# Patient Record
Sex: MALE | Race: WHITE
[De-identification: names, ages, dates, MRNs, and addresses within clinical notes are randomized per-mention and may not be internally consistent; named-entity substitution may affect disease eponyms.]

---

## 2018-01-22 ENCOUNTER — HOSPITAL ENCOUNTER (INPATIENT)
Dept: HOSPITAL 100 - ED | Age: 68
LOS: 4 days | Discharge: HOME | DRG: 286 | End: 2018-01-26
Payer: MEDICARE

## 2018-01-22 VITALS
OXYGEN SATURATION: 94 % | HEART RATE: 123 BPM | SYSTOLIC BLOOD PRESSURE: 153 MMHG | RESPIRATION RATE: 22 BRPM | DIASTOLIC BLOOD PRESSURE: 102 MMHG

## 2018-01-22 VITALS
RESPIRATION RATE: 19 BRPM | SYSTOLIC BLOOD PRESSURE: 153 MMHG | DIASTOLIC BLOOD PRESSURE: 114 MMHG | TEMPERATURE: 98.06 F | HEART RATE: 124 BPM | OXYGEN SATURATION: 97 %

## 2018-01-22 VITALS
OXYGEN SATURATION: 95 % | TEMPERATURE: 96.8 F | SYSTOLIC BLOOD PRESSURE: 126 MMHG | RESPIRATION RATE: 16 BRPM | DIASTOLIC BLOOD PRESSURE: 97 MMHG | HEART RATE: 120 BPM

## 2018-01-22 VITALS
OXYGEN SATURATION: 98 % | RESPIRATION RATE: 26 BRPM | TEMPERATURE: 98.06 F | SYSTOLIC BLOOD PRESSURE: 120 MMHG | HEART RATE: 124 BPM | DIASTOLIC BLOOD PRESSURE: 98 MMHG

## 2018-01-22 VITALS
HEART RATE: 109 BPM | OXYGEN SATURATION: 96 % | SYSTOLIC BLOOD PRESSURE: 202 MMHG | TEMPERATURE: 98 F | DIASTOLIC BLOOD PRESSURE: 111 MMHG | RESPIRATION RATE: 18 BRPM

## 2018-01-22 VITALS
SYSTOLIC BLOOD PRESSURE: 165 MMHG | DIASTOLIC BLOOD PRESSURE: 119 MMHG | RESPIRATION RATE: 18 BRPM | HEART RATE: 123 BPM | OXYGEN SATURATION: 95 % | TEMPERATURE: 98.06 F

## 2018-01-22 VITALS
OXYGEN SATURATION: 95 % | SYSTOLIC BLOOD PRESSURE: 127 MMHG | TEMPERATURE: 98.06 F | RESPIRATION RATE: 24 BRPM | HEART RATE: 120 BPM | DIASTOLIC BLOOD PRESSURE: 79 MMHG

## 2018-01-22 VITALS
RESPIRATION RATE: 13 BRPM | DIASTOLIC BLOOD PRESSURE: 101 MMHG | OXYGEN SATURATION: 100 % | TEMPERATURE: 96.8 F | HEART RATE: 118 BPM | SYSTOLIC BLOOD PRESSURE: 126 MMHG

## 2018-01-22 VITALS
TEMPERATURE: 98.06 F | RESPIRATION RATE: 20 BRPM | DIASTOLIC BLOOD PRESSURE: 96 MMHG | SYSTOLIC BLOOD PRESSURE: 141 MMHG | HEART RATE: 123 BPM | OXYGEN SATURATION: 96 %

## 2018-01-22 VITALS
RESPIRATION RATE: 18 BRPM | HEART RATE: 124 BPM | TEMPERATURE: 98.1 F | OXYGEN SATURATION: 99 % | DIASTOLIC BLOOD PRESSURE: 103 MMHG | SYSTOLIC BLOOD PRESSURE: 155 MMHG

## 2018-01-22 VITALS
TEMPERATURE: 98.06 F | RESPIRATION RATE: 18 BRPM | HEART RATE: 123 BPM | OXYGEN SATURATION: 96 % | DIASTOLIC BLOOD PRESSURE: 110 MMHG | SYSTOLIC BLOOD PRESSURE: 154 MMHG

## 2018-01-22 VITALS — HEART RATE: 121 BPM

## 2018-01-22 VITALS
RESPIRATION RATE: 28 BRPM | OXYGEN SATURATION: 94 % | SYSTOLIC BLOOD PRESSURE: 148 MMHG | DIASTOLIC BLOOD PRESSURE: 100 MMHG | HEART RATE: 124 BPM

## 2018-01-22 VITALS
RESPIRATION RATE: 20 BRPM | DIASTOLIC BLOOD PRESSURE: 103 MMHG | HEART RATE: 123 BPM | OXYGEN SATURATION: 96 % | SYSTOLIC BLOOD PRESSURE: 115 MMHG

## 2018-01-22 VITALS
HEART RATE: 125 BPM | RESPIRATION RATE: 18 BRPM | DIASTOLIC BLOOD PRESSURE: 98 MMHG | SYSTOLIC BLOOD PRESSURE: 142 MMHG | OXYGEN SATURATION: 98 %

## 2018-01-22 VITALS
RESPIRATION RATE: 19 BRPM | OXYGEN SATURATION: 95 % | DIASTOLIC BLOOD PRESSURE: 87 MMHG | SYSTOLIC BLOOD PRESSURE: 133 MMHG | HEART RATE: 119 BPM | TEMPERATURE: 97.6 F

## 2018-01-22 VITALS — HEART RATE: 117 BPM

## 2018-01-22 VITALS
TEMPERATURE: 97.52 F | SYSTOLIC BLOOD PRESSURE: 130 MMHG | OXYGEN SATURATION: 99 % | RESPIRATION RATE: 19 BRPM | DIASTOLIC BLOOD PRESSURE: 93 MMHG | HEART RATE: 120 BPM

## 2018-01-22 VITALS — DIASTOLIC BLOOD PRESSURE: 91 MMHG | SYSTOLIC BLOOD PRESSURE: 137 MMHG

## 2018-01-22 VITALS
SYSTOLIC BLOOD PRESSURE: 143 MMHG | RESPIRATION RATE: 20 BRPM | HEART RATE: 123 BPM | DIASTOLIC BLOOD PRESSURE: 100 MMHG | OXYGEN SATURATION: 98 %

## 2018-01-22 VITALS — BODY MASS INDEX: 30.7 KG/M2 | BODY MASS INDEX: 29.7 KG/M2

## 2018-01-22 VITALS — HEART RATE: 124 BPM

## 2018-01-22 DIAGNOSIS — Z87.891: ICD-10-CM

## 2018-01-22 DIAGNOSIS — I48.92: ICD-10-CM

## 2018-01-22 DIAGNOSIS — I50.21: ICD-10-CM

## 2018-01-22 DIAGNOSIS — I42.9: ICD-10-CM

## 2018-01-22 DIAGNOSIS — K64.8: ICD-10-CM

## 2018-01-22 DIAGNOSIS — I48.91: Primary | ICD-10-CM

## 2018-01-22 DIAGNOSIS — K57.91: ICD-10-CM

## 2018-01-22 DIAGNOSIS — I25.10: ICD-10-CM

## 2018-01-22 LAB
ALANINE AMINOTRANSFER ALT/SGPT: 55 U/L (ref 12–78)
ALBUMIN SERPL-MCNC: 3.8 G/DL (ref 3.4–5)
ALKALINE PHOSPHATASE: 96 U/L (ref 45–117)
ANION GAP: 11 (ref 5–15)
AST(SGOT): 25 U/L (ref 15–37)
BILIRUB DIRECT SERPL-MCNC: 0.3 MG/DL (ref 0–0.3)
BNP,B-TYPE NATRIURETIC PEPTIDE: 874.6 PG/ML (ref 0–100)
BUN SERPL-MCNC: 19 MG/DL (ref 7–18)
BUN/CREAT RATIO: 14.4 RATIO (ref 10–20)
CALCIUM SERPL-MCNC: 8.9 MG/DL (ref 8.5–10.1)
CARBON DIOXIDE: 22 MMOL/L (ref 21–32)
CHLORIDE: 108 MMOL/L (ref 98–107)
CHOLEST SERPL-MCNC: 166 MG/DL
D-DIMER QUANTITATIVE (DVT/PE): 0.84 FEU/UG/M (ref 0.27–0.49)
DEPRECATED RDW RBC: 46.1 FL (ref 35.1–43.9)
DIFFERENTIAL INDICATED: (no result)
ERYTHROCYTE [DISTWIDTH] IN BLOOD: 14.1 % (ref 11.6–14.6)
EST GLOM FILT RATE - AFR AMER: 70 ML/MIN (ref 60–?)
ESTIMATED CREATININE CLEARANCE: 63.05 ML/MIN
GLOBULIN: 3.5 G/DL (ref 2.2–4.2)
GLUCOSE: 110 MG/DL (ref 70–110)
HCT VFR BLD AUTO: 44.1 % (ref 40–54)
HEMOGLOBIN: 14.2 G/DL (ref 13–16.5)
HGB BLD-MCNC: 14.2 G/DL (ref 13–16.5)
IMMATURE GRANULOCYTES COUNT: 0.03 X10^3/UL (ref 0–0)
LIPASE: 116 U/L (ref 73–393)
MCV RBC: 92.3 FL (ref 80–94)
MEAN CORP HGB CONC: 32.2 G/GL (ref 32–36)
MEAN PLATELET VOL.: 10.7 FL (ref 6.2–12)
PLATELET # BLD: 210 K/MM3 (ref 150–450)
PLATELET COUNT: 210 K/MM3 (ref 150–450)
POSITIVE COUNT: NO
POSITIVE DIFFERENTIAL: NO
POSITIVE MORPHOLOGY: NO
POTASSIUM: 4.3 MMOL/L (ref 3.5–5.1)
RBC # BLD AUTO: 4.78 M/MM3 (ref 4.6–6.2)
RBC DISTRIBUTION WIDTH CV: 14.1 % (ref 11.6–14.6)
RBC DISTRIBUTION WIDTH SD: 46.1 FL (ref 35.1–43.9)
TRIGLYCERIDES: 119 MG/DL
VLDLC SERPL-MCNC: 24 MG/DL (ref 5–40)
WBC # BLD AUTO: 13.2 K/MM3 (ref 4.4–11)
WHITE BLOOD COUNT: 13.2 K/MM3 (ref 4.4–11)

## 2018-01-22 PROCEDURE — 93312 ECHO TRANSESOPHAGEAL: CPT

## 2018-01-22 PROCEDURE — 85027 COMPLETE CBC AUTOMATED: CPT

## 2018-01-22 PROCEDURE — 71275 CT ANGIOGRAPHY CHEST: CPT

## 2018-01-22 PROCEDURE — 84443 ASSAY THYROID STIM HORMONE: CPT

## 2018-01-22 PROCEDURE — 83880 ASSAY OF NATRIURETIC PEPTIDE: CPT

## 2018-01-22 PROCEDURE — 93325 DOPPLER ECHO COLOR FLOW MAPG: CPT

## 2018-01-22 PROCEDURE — 93306 TTE W/DOPPLER COMPLETE: CPT

## 2018-01-22 PROCEDURE — 97802 MEDICAL NUTRITION INDIV IN: CPT

## 2018-01-22 PROCEDURE — 99152 MOD SED SAME PHYS/QHP 5/>YRS: CPT

## 2018-01-22 PROCEDURE — C1894 INTRO/SHEATH, NON-LASER: HCPCS

## 2018-01-22 PROCEDURE — 93005 ELECTROCARDIOGRAM TRACING: CPT

## 2018-01-22 PROCEDURE — 99285 EMERGENCY DEPT VISIT HI MDM: CPT

## 2018-01-22 PROCEDURE — 93460 R&L HRT ART/VENTRICLE ANGIO: CPT

## 2018-01-22 PROCEDURE — 80076 HEPATIC FUNCTION PANEL: CPT

## 2018-01-22 PROCEDURE — 92960 CARDIOVERSION ELECTRIC EXT: CPT

## 2018-01-22 PROCEDURE — 76705 ECHO EXAM OF ABDOMEN: CPT

## 2018-01-22 PROCEDURE — 93320 DOPPLER ECHO COMPLETE: CPT

## 2018-01-22 PROCEDURE — 84484 ASSAY OF TROPONIN QUANT: CPT

## 2018-01-22 PROCEDURE — 80061 LIPID PANEL: CPT

## 2018-01-22 PROCEDURE — 83690 ASSAY OF LIPASE: CPT

## 2018-01-22 PROCEDURE — 82803 BLOOD GASES ANY COMBINATION: CPT

## 2018-01-22 PROCEDURE — 84439 ASSAY OF FREE THYROXINE: CPT

## 2018-01-22 PROCEDURE — A4216 STERILE WATER/SALINE, 10 ML: HCPCS

## 2018-01-22 PROCEDURE — 85730 THROMBOPLASTIN TIME PARTIAL: CPT

## 2018-01-22 PROCEDURE — 93971 EXTREMITY STUDY: CPT

## 2018-01-22 PROCEDURE — 80048 BASIC METABOLIC PNL TOTAL CA: CPT

## 2018-01-22 PROCEDURE — C1751 CATH, INF, PER/CENT/MIDLINE: HCPCS

## 2018-01-22 PROCEDURE — 36415 COLL VENOUS BLD VENIPUNCTURE: CPT

## 2018-01-22 PROCEDURE — 71045 X-RAY EXAM CHEST 1 VIEW: CPT

## 2018-01-22 PROCEDURE — 99153 MOD SED SAME PHYS/QHP EA: CPT

## 2018-01-22 PROCEDURE — 85610 PROTHROMBIN TIME: CPT

## 2018-01-22 PROCEDURE — 85025 COMPLETE CBC W/AUTO DIFF WBC: CPT

## 2018-01-22 PROCEDURE — 99406 BEHAV CHNG SMOKING 3-10 MIN: CPT

## 2018-01-22 PROCEDURE — 85379 FIBRIN DEGRADATION QUANT: CPT

## 2018-01-22 PROCEDURE — C1769 GUIDE WIRE: HCPCS

## 2018-01-23 VITALS
DIASTOLIC BLOOD PRESSURE: 89 MMHG | TEMPERATURE: 98 F | HEART RATE: 119 BPM | SYSTOLIC BLOOD PRESSURE: 136 MMHG | RESPIRATION RATE: 19 BRPM | OXYGEN SATURATION: 94 %

## 2018-01-23 VITALS
HEART RATE: 116 BPM | OXYGEN SATURATION: 99 % | SYSTOLIC BLOOD PRESSURE: 135 MMHG | TEMPERATURE: 97.1 F | DIASTOLIC BLOOD PRESSURE: 109 MMHG | RESPIRATION RATE: 20 BRPM

## 2018-01-23 VITALS
TEMPERATURE: 96.98 F | HEART RATE: 116 BPM | DIASTOLIC BLOOD PRESSURE: 107 MMHG | SYSTOLIC BLOOD PRESSURE: 139 MMHG | RESPIRATION RATE: 17 BRPM | OXYGEN SATURATION: 97 %

## 2018-01-23 VITALS
SYSTOLIC BLOOD PRESSURE: 125 MMHG | HEART RATE: 118 BPM | OXYGEN SATURATION: 97 % | TEMPERATURE: 98 F | RESPIRATION RATE: 17 BRPM | DIASTOLIC BLOOD PRESSURE: 80 MMHG

## 2018-01-23 VITALS
OXYGEN SATURATION: 100 % | TEMPERATURE: 98.1 F | DIASTOLIC BLOOD PRESSURE: 93 MMHG | RESPIRATION RATE: 20 BRPM | HEART RATE: 114 BPM | SYSTOLIC BLOOD PRESSURE: 128 MMHG

## 2018-01-23 VITALS
HEART RATE: 117 BPM | DIASTOLIC BLOOD PRESSURE: 86 MMHG | TEMPERATURE: 98.06 F | RESPIRATION RATE: 18 BRPM | OXYGEN SATURATION: 99 % | SYSTOLIC BLOOD PRESSURE: 116 MMHG

## 2018-01-23 VITALS
OXYGEN SATURATION: 99 % | RESPIRATION RATE: 18 BRPM | SYSTOLIC BLOOD PRESSURE: 134 MMHG | DIASTOLIC BLOOD PRESSURE: 124 MMHG | TEMPERATURE: 98 F | HEART RATE: 116 BPM

## 2018-01-23 VITALS
HEART RATE: 116 BPM | DIASTOLIC BLOOD PRESSURE: 97 MMHG | TEMPERATURE: 98.24 F | SYSTOLIC BLOOD PRESSURE: 120 MMHG | OXYGEN SATURATION: 99 % | RESPIRATION RATE: 17 BRPM

## 2018-01-23 VITALS
HEART RATE: 118 BPM | RESPIRATION RATE: 17 BRPM | SYSTOLIC BLOOD PRESSURE: 120 MMHG | TEMPERATURE: 98.06 F | OXYGEN SATURATION: 94 % | DIASTOLIC BLOOD PRESSURE: 92 MMHG

## 2018-01-23 VITALS
SYSTOLIC BLOOD PRESSURE: 130 MMHG | TEMPERATURE: 97 F | DIASTOLIC BLOOD PRESSURE: 89 MMHG | OXYGEN SATURATION: 96 % | HEART RATE: 115 BPM | RESPIRATION RATE: 18 BRPM

## 2018-01-23 VITALS
HEART RATE: 115 BPM | TEMPERATURE: 97.5 F | SYSTOLIC BLOOD PRESSURE: 127 MMHG | RESPIRATION RATE: 20 BRPM | OXYGEN SATURATION: 99 % | DIASTOLIC BLOOD PRESSURE: 82 MMHG

## 2018-01-23 VITALS
TEMPERATURE: 96.8 F | SYSTOLIC BLOOD PRESSURE: 124 MMHG | HEART RATE: 115 BPM | OXYGEN SATURATION: 95 % | DIASTOLIC BLOOD PRESSURE: 79 MMHG | RESPIRATION RATE: 17 BRPM

## 2018-01-23 VITALS
DIASTOLIC BLOOD PRESSURE: 93 MMHG | RESPIRATION RATE: 15 BRPM | TEMPERATURE: 96.8 F | SYSTOLIC BLOOD PRESSURE: 125 MMHG | HEART RATE: 117 BPM | OXYGEN SATURATION: 97 %

## 2018-01-23 VITALS
OXYGEN SATURATION: 96 % | TEMPERATURE: 98 F | RESPIRATION RATE: 20 BRPM | HEART RATE: 118 BPM | SYSTOLIC BLOOD PRESSURE: 116 MMHG | DIASTOLIC BLOOD PRESSURE: 85 MMHG

## 2018-01-23 VITALS
RESPIRATION RATE: 11 BRPM | DIASTOLIC BLOOD PRESSURE: 88 MMHG | OXYGEN SATURATION: 97 % | TEMPERATURE: 98.1 F | SYSTOLIC BLOOD PRESSURE: 114 MMHG | HEART RATE: 113 BPM

## 2018-01-23 VITALS
RESPIRATION RATE: 16 BRPM | TEMPERATURE: 98 F | HEART RATE: 118 BPM | DIASTOLIC BLOOD PRESSURE: 68 MMHG | OXYGEN SATURATION: 94 % | SYSTOLIC BLOOD PRESSURE: 117 MMHG

## 2018-01-23 VITALS
DIASTOLIC BLOOD PRESSURE: 80 MMHG | HEART RATE: 116 BPM | RESPIRATION RATE: 13 BRPM | SYSTOLIC BLOOD PRESSURE: 125 MMHG | TEMPERATURE: 96.8 F | OXYGEN SATURATION: 100 %

## 2018-01-23 VITALS
DIASTOLIC BLOOD PRESSURE: 102 MMHG | OXYGEN SATURATION: 97 % | HEART RATE: 118 BPM | TEMPERATURE: 98.3 F | RESPIRATION RATE: 23 BRPM | SYSTOLIC BLOOD PRESSURE: 128 MMHG

## 2018-01-23 VITALS
DIASTOLIC BLOOD PRESSURE: 110 MMHG | SYSTOLIC BLOOD PRESSURE: 130 MMHG | OXYGEN SATURATION: 99 % | TEMPERATURE: 98.06 F | RESPIRATION RATE: 19 BRPM | HEART RATE: 117 BPM

## 2018-01-23 VITALS — HEART RATE: 115 BPM

## 2018-01-23 VITALS
RESPIRATION RATE: 18 BRPM | DIASTOLIC BLOOD PRESSURE: 87 MMHG | TEMPERATURE: 97.52 F | OXYGEN SATURATION: 99 % | HEART RATE: 114 BPM | SYSTOLIC BLOOD PRESSURE: 123 MMHG

## 2018-01-23 VITALS
HEART RATE: 70 BPM | SYSTOLIC BLOOD PRESSURE: 130 MMHG | RESPIRATION RATE: 14 BRPM | TEMPERATURE: 97.1 F | DIASTOLIC BLOOD PRESSURE: 91 MMHG | OXYGEN SATURATION: 98 %

## 2018-01-23 VITALS
DIASTOLIC BLOOD PRESSURE: 110 MMHG | SYSTOLIC BLOOD PRESSURE: 125 MMHG | RESPIRATION RATE: 11 BRPM | OXYGEN SATURATION: 100 % | HEART RATE: 117 BPM | TEMPERATURE: 96.8 F

## 2018-01-23 VITALS
TEMPERATURE: 98.24 F | OXYGEN SATURATION: 98 % | HEART RATE: 117 BPM | RESPIRATION RATE: 19 BRPM | DIASTOLIC BLOOD PRESSURE: 100 MMHG | SYSTOLIC BLOOD PRESSURE: 135 MMHG

## 2018-01-23 VITALS
RESPIRATION RATE: 20 BRPM | SYSTOLIC BLOOD PRESSURE: 120 MMHG | DIASTOLIC BLOOD PRESSURE: 86 MMHG | TEMPERATURE: 98.24 F | HEART RATE: 116 BPM | OXYGEN SATURATION: 96 %

## 2018-01-23 VITALS — HEART RATE: 114 BPM

## 2018-01-23 VITALS — HEART RATE: 116 BPM

## 2018-01-23 LAB
ANION GAP: 7 (ref 5–15)
BUN SERPL-MCNC: 20 MG/DL (ref 7–18)
BUN/CREAT RATIO: 14.9 RATIO (ref 10–20)
CALCIUM SERPL-MCNC: 8.4 MG/DL (ref 8.5–10.1)
CARBON DIOXIDE: 25 MMOL/L (ref 21–32)
CHLORIDE: 107 MMOL/L (ref 98–107)
DEPRECATED RDW RBC: 46.1 FL (ref 35.1–43.9)
ERYTHROCYTE [DISTWIDTH] IN BLOOD: 14 % (ref 11.6–14.6)
EST GLOM FILT RATE - AFR AMER: 69 ML/MIN (ref 60–?)
ESTIMATED CREATININE CLEARANCE: 62.11 ML/MIN
GLUCOSE: 111 MG/DL (ref 70–110)
HCT VFR BLD AUTO: 38.9 % (ref 40–54)
HEMOGLOBIN: 12.8 G/DL (ref 13–16.5)
HGB BLD-MCNC: 12.8 G/DL (ref 13–16.5)
MCV RBC: 92.4 FL (ref 80–94)
MEAN CORP HGB CONC: 32.9 G/GL (ref 32–36)
MEAN PLATELET VOL.: 11 FL (ref 6.2–12)
PLATELET # BLD: 165 K/MM3 (ref 150–450)
PLATELET COUNT: 165 K/MM3 (ref 150–450)
POTASSIUM: 4 MMOL/L (ref 3.5–5.1)
RBC # BLD AUTO: 4.21 M/MM3 (ref 4.6–6.2)
RBC DISTRIBUTION WIDTH CV: 14 % (ref 11.6–14.6)
RBC DISTRIBUTION WIDTH SD: 46.1 FL (ref 35.1–43.9)
SCAN INDICATED ON CBC? Y/N: NO
WBC # BLD AUTO: 9.5 K/MM3 (ref 4.4–11)
WHITE BLOOD COUNT: 9.5 K/MM3 (ref 4.4–11)

## 2018-01-23 RX ADMIN — SODIUM CHLORIDE, PRESERVATIVE FREE 0 ML: 5 INJECTION INTRAVENOUS at 08:52

## 2018-01-23 RX ADMIN — SODIUM CHLORIDE, PRESERVATIVE FREE 0 ML: 5 INJECTION INTRAVENOUS at 17:51

## 2018-01-23 RX ADMIN — SENNOSIDES 2 TABLET: 8.6 TABLET, FILM COATED ORAL at 14:11

## 2018-01-24 VITALS
OXYGEN SATURATION: 96 % | DIASTOLIC BLOOD PRESSURE: 80 MMHG | RESPIRATION RATE: 13 BRPM | HEART RATE: 113 BPM | TEMPERATURE: 97.7 F | SYSTOLIC BLOOD PRESSURE: 117 MMHG

## 2018-01-24 VITALS
RESPIRATION RATE: 12 BRPM | TEMPERATURE: 97.9 F | SYSTOLIC BLOOD PRESSURE: 96 MMHG | DIASTOLIC BLOOD PRESSURE: 78 MMHG | HEART RATE: 109 BPM | OXYGEN SATURATION: 96 %

## 2018-01-24 VITALS
TEMPERATURE: 97.6 F | DIASTOLIC BLOOD PRESSURE: 92 MMHG | OXYGEN SATURATION: 96 % | HEART RATE: 114 BPM | SYSTOLIC BLOOD PRESSURE: 118 MMHG | RESPIRATION RATE: 22 BRPM

## 2018-01-24 VITALS
RESPIRATION RATE: 10 BRPM | HEART RATE: 110 BPM | OXYGEN SATURATION: 95 % | TEMPERATURE: 97.7 F | SYSTOLIC BLOOD PRESSURE: 112 MMHG | DIASTOLIC BLOOD PRESSURE: 85 MMHG

## 2018-01-24 VITALS
OXYGEN SATURATION: 100 % | RESPIRATION RATE: 20 BRPM | HEART RATE: 111 BPM | SYSTOLIC BLOOD PRESSURE: 112 MMHG | DIASTOLIC BLOOD PRESSURE: 93 MMHG

## 2018-01-24 VITALS
OXYGEN SATURATION: 96 % | SYSTOLIC BLOOD PRESSURE: 124 MMHG | DIASTOLIC BLOOD PRESSURE: 82 MMHG | HEART RATE: 113 BPM | TEMPERATURE: 97.88 F | RESPIRATION RATE: 19 BRPM

## 2018-01-24 VITALS
HEART RATE: 114 BPM | RESPIRATION RATE: 17 BRPM | SYSTOLIC BLOOD PRESSURE: 117 MMHG | OXYGEN SATURATION: 98 % | TEMPERATURE: 97.6 F | DIASTOLIC BLOOD PRESSURE: 82 MMHG

## 2018-01-24 VITALS
SYSTOLIC BLOOD PRESSURE: 122 MMHG | OXYGEN SATURATION: 96 % | HEART RATE: 113 BPM | DIASTOLIC BLOOD PRESSURE: 99 MMHG | RESPIRATION RATE: 15 BRPM | TEMPERATURE: 97.52 F

## 2018-01-24 VITALS — HEART RATE: 112 BPM

## 2018-01-24 VITALS
OXYGEN SATURATION: 97 % | RESPIRATION RATE: 16 BRPM | TEMPERATURE: 97.88 F | SYSTOLIC BLOOD PRESSURE: 125 MMHG | HEART RATE: 113 BPM | DIASTOLIC BLOOD PRESSURE: 88 MMHG

## 2018-01-24 VITALS
OXYGEN SATURATION: 100 % | HEART RATE: 109 BPM | DIASTOLIC BLOOD PRESSURE: 80 MMHG | RESPIRATION RATE: 18 BRPM | SYSTOLIC BLOOD PRESSURE: 103 MMHG

## 2018-01-24 VITALS
OXYGEN SATURATION: 97 % | DIASTOLIC BLOOD PRESSURE: 83 MMHG | RESPIRATION RATE: 18 BRPM | SYSTOLIC BLOOD PRESSURE: 103 MMHG | HEART RATE: 110 BPM

## 2018-01-24 VITALS
DIASTOLIC BLOOD PRESSURE: 81 MMHG | RESPIRATION RATE: 22 BRPM | OXYGEN SATURATION: 95 % | HEART RATE: 112 BPM | SYSTOLIC BLOOD PRESSURE: 115 MMHG

## 2018-01-24 VITALS
SYSTOLIC BLOOD PRESSURE: 112 MMHG | TEMPERATURE: 98.06 F | DIASTOLIC BLOOD PRESSURE: 85 MMHG | RESPIRATION RATE: 16 BRPM | OXYGEN SATURATION: 96 % | HEART RATE: 113 BPM

## 2018-01-24 VITALS
OXYGEN SATURATION: 96 % | RESPIRATION RATE: 17 BRPM | SYSTOLIC BLOOD PRESSURE: 104 MMHG | HEART RATE: 109 BPM | DIASTOLIC BLOOD PRESSURE: 79 MMHG

## 2018-01-24 VITALS
RESPIRATION RATE: 14 BRPM | SYSTOLIC BLOOD PRESSURE: 125 MMHG | OXYGEN SATURATION: 97 % | TEMPERATURE: 97.9 F | DIASTOLIC BLOOD PRESSURE: 80 MMHG | HEART RATE: 113 BPM

## 2018-01-24 VITALS
HEART RATE: 114 BPM | OXYGEN SATURATION: 97 % | SYSTOLIC BLOOD PRESSURE: 126 MMHG | TEMPERATURE: 97.6 F | DIASTOLIC BLOOD PRESSURE: 98 MMHG | RESPIRATION RATE: 20 BRPM

## 2018-01-24 VITALS
SYSTOLIC BLOOD PRESSURE: 107 MMHG | TEMPERATURE: 97.88 F | RESPIRATION RATE: 16 BRPM | OXYGEN SATURATION: 97 % | HEART RATE: 112 BPM | DIASTOLIC BLOOD PRESSURE: 73 MMHG

## 2018-01-24 VITALS
SYSTOLIC BLOOD PRESSURE: 114 MMHG | HEART RATE: 114 BPM | RESPIRATION RATE: 14 BRPM | DIASTOLIC BLOOD PRESSURE: 88 MMHG | TEMPERATURE: 97.6 F | OXYGEN SATURATION: 98 %

## 2018-01-24 VITALS
DIASTOLIC BLOOD PRESSURE: 92 MMHG | SYSTOLIC BLOOD PRESSURE: 120 MMHG | RESPIRATION RATE: 22 BRPM | OXYGEN SATURATION: 100 % | HEART RATE: 111 BPM

## 2018-01-24 VITALS
SYSTOLIC BLOOD PRESSURE: 127 MMHG | OXYGEN SATURATION: 99 % | RESPIRATION RATE: 15 BRPM | TEMPERATURE: 97.88 F | DIASTOLIC BLOOD PRESSURE: 94 MMHG | HEART RATE: 113 BPM

## 2018-01-24 VITALS — HEART RATE: 113 BPM

## 2018-01-24 VITALS — HEART RATE: 109 BPM

## 2018-01-24 LAB
ANION GAP: 11 (ref 5–15)
APTT PPP: 36 SECONDS (ref 24.1–36.2)
BASE EXCESS BLDV CALC-SCNC: -2 MMOL/L
BASE EXCESS BLDV CALC-SCNC: 0 MMOL/L (ref -1–3.5)
BUN SERPL-MCNC: 21 MG/DL (ref 7–18)
BUN/CREAT RATIO: 15.1 RATIO (ref 10–20)
CALCIUM SERPL-MCNC: 8.6 MG/DL (ref 8.5–10.1)
CARBON DIOXIDE: 24 MMOL/L (ref 21–32)
CHLORIDE: 105 MMOL/L (ref 98–107)
CO2 BLDA-SCNC: 26 MMOL/L (ref 23–33)
DEPRECATED RDW RBC: 47.4 FL (ref 35.1–43.9)
DIFFERENTIAL INDICATED: (no result)
ERYTHROCYTE [DISTWIDTH] IN BLOOD: 14.1 % (ref 11.6–14.6)
EST GLOM FILT RATE - AFR AMER: 66 ML/MIN (ref 60–?)
ESTIMATED CREATININE CLEARANCE: 59.88 ML/MIN
GLUCOSE: 120 MG/DL (ref 70–110)
HCT VFR BLD AUTO: 41.3 % (ref 40–54)
HEMOGLOBIN: 13 G/DL (ref 13–16.5)
HGB BLD-MCNC: 13 G/DL (ref 13–16.5)
IMMATURE GRANULOCYTES COUNT: 0.01 X10^3/UL (ref 0–0)
MCV RBC: 92.8 FL (ref 80–94)
MEAN CORP HGB CONC: 31.5 G/GL (ref 32–36)
MEAN PLATELET VOL.: 10.8 FL (ref 6.2–12)
PLATELET # BLD: 178 K/MM3 (ref 150–450)
PLATELET COUNT: 178 K/MM3 (ref 150–450)
PO2 BLDA: 56 MMHG (ref 75–100)
PO2 BLDV: 26 MMHG (ref 25–40)
POSITIVE COUNT: NO
POSITIVE DIFFERENTIAL: NO
POSITIVE MORPHOLOGY: NO
POTASSIUM: 3.9 MMOL/L (ref 3.5–5.1)
PROTHROMBIN TIME (PROTIME)PT.: 14.3 SECONDS (ref 11.7–14.9)
RBC # BLD AUTO: 4.45 M/MM3 (ref 4.6–6.2)
RBC DISTRIBUTION WIDTH CV: 14.1 % (ref 11.6–14.6)
RBC DISTRIBUTION WIDTH SD: 47.4 FL (ref 35.1–43.9)
SO2: 90 % (ref 95–99)
VBG SO2: 50 % (ref 50–70)
WBC # BLD AUTO: 9.5 K/MM3 (ref 4.4–11)
WHITE BLOOD COUNT: 9.5 K/MM3 (ref 4.4–11)

## 2018-01-24 RX ADMIN — SODIUM CHLORIDE 15 ML: 9 INJECTION, SOLUTION INTRAVENOUS at 05:57

## 2018-01-24 RX ADMIN — SODIUM CHLORIDE, PRESERVATIVE FREE 0 ML: 5 INJECTION INTRAVENOUS at 12:02

## 2018-01-24 RX ADMIN — SENNOSIDES 2 TABLET: 8.6 TABLET, FILM COATED ORAL at 12:02

## 2018-01-25 VITALS
RESPIRATION RATE: 18 BRPM | TEMPERATURE: 98.06 F | DIASTOLIC BLOOD PRESSURE: 81 MMHG | HEART RATE: 110 BPM | SYSTOLIC BLOOD PRESSURE: 119 MMHG | OXYGEN SATURATION: 98 %

## 2018-01-25 VITALS
DIASTOLIC BLOOD PRESSURE: 77 MMHG | RESPIRATION RATE: 18 BRPM | TEMPERATURE: 98.06 F | SYSTOLIC BLOOD PRESSURE: 121 MMHG | OXYGEN SATURATION: 98 % | HEART RATE: 110 BPM

## 2018-01-25 VITALS
SYSTOLIC BLOOD PRESSURE: 124 MMHG | DIASTOLIC BLOOD PRESSURE: 71 MMHG | RESPIRATION RATE: 16 BRPM | HEART RATE: 113 BPM | OXYGEN SATURATION: 100 % | TEMPERATURE: 98.06 F

## 2018-01-25 VITALS — HEART RATE: 112 BPM

## 2018-01-25 VITALS
SYSTOLIC BLOOD PRESSURE: 115 MMHG | OXYGEN SATURATION: 98 % | HEART RATE: 112 BPM | RESPIRATION RATE: 16 BRPM | DIASTOLIC BLOOD PRESSURE: 74 MMHG | TEMPERATURE: 98 F

## 2018-01-25 VITALS — OXYGEN SATURATION: 100 %

## 2018-01-25 VITALS — HEART RATE: 110 BPM

## 2018-01-25 VITALS — HEART RATE: 115 BPM

## 2018-01-25 LAB
ANION GAP: 9 (ref 5–15)
BUN SERPL-MCNC: 23 MG/DL (ref 7–18)
BUN/CREAT RATIO: 16.2 RATIO (ref 10–20)
CALCIUM SERPL-MCNC: 8.7 MG/DL (ref 8.5–10.1)
CARBON DIOXIDE: 26 MMOL/L (ref 21–32)
CHLORIDE: 104 MMOL/L (ref 98–107)
EST GLOM FILT RATE - AFR AMER: 64 ML/MIN (ref 60–?)
ESTIMATED CREATININE CLEARANCE: 58.61 ML/MIN
GLUCOSE: 131 MG/DL (ref 70–110)
POTASSIUM: 3.9 MMOL/L (ref 3.5–5.1)

## 2018-01-25 RX ADMIN — SENNOSIDES 2 TABLET: 8.6 TABLET, FILM COATED ORAL at 10:13

## 2018-01-25 RX ADMIN — SODIUM CHLORIDE, PRESERVATIVE FREE 0 ML: 5 INJECTION INTRAVENOUS at 10:12

## 2018-01-25 RX ADMIN — APIXABAN 2.5 MG: 2.5 TABLET, FILM COATED ORAL at 21:13

## 2018-01-26 VITALS
OXYGEN SATURATION: 97 % | SYSTOLIC BLOOD PRESSURE: 112 MMHG | HEART RATE: 62 BPM | RESPIRATION RATE: 14 BRPM | TEMPERATURE: 98.06 F | DIASTOLIC BLOOD PRESSURE: 75 MMHG

## 2018-01-26 VITALS
RESPIRATION RATE: 15 BRPM | TEMPERATURE: 98.2 F | SYSTOLIC BLOOD PRESSURE: 98 MMHG | HEART RATE: 86 BPM | DIASTOLIC BLOOD PRESSURE: 71 MMHG | OXYGEN SATURATION: 97 %

## 2018-01-26 VITALS — HEART RATE: 87 BPM

## 2018-01-26 VITALS
RESPIRATION RATE: 18 BRPM | DIASTOLIC BLOOD PRESSURE: 70 MMHG | HEART RATE: 111 BPM | SYSTOLIC BLOOD PRESSURE: 100 MMHG | OXYGEN SATURATION: 97 % | TEMPERATURE: 98.3 F

## 2018-01-26 VITALS
HEART RATE: 74 BPM | OXYGEN SATURATION: 97 % | DIASTOLIC BLOOD PRESSURE: 62 MMHG | SYSTOLIC BLOOD PRESSURE: 100 MMHG | RESPIRATION RATE: 16 BRPM

## 2018-01-26 VITALS — HEART RATE: 112 BPM

## 2018-01-26 VITALS — OXYGEN SATURATION: 98 %

## 2018-01-26 VITALS
TEMPERATURE: 98.2 F | OXYGEN SATURATION: 97 % | SYSTOLIC BLOOD PRESSURE: 86 MMHG | HEART RATE: 87 BPM | DIASTOLIC BLOOD PRESSURE: 58 MMHG | RESPIRATION RATE: 16 BRPM

## 2018-01-26 VITALS
RESPIRATION RATE: 15 BRPM | HEART RATE: 87 BPM | SYSTOLIC BLOOD PRESSURE: 92 MMHG | TEMPERATURE: 98.1 F | OXYGEN SATURATION: 94 % | DIASTOLIC BLOOD PRESSURE: 61 MMHG

## 2018-01-26 VITALS
HEART RATE: 108 BPM | TEMPERATURE: 97.7 F | SYSTOLIC BLOOD PRESSURE: 104 MMHG | DIASTOLIC BLOOD PRESSURE: 65 MMHG | OXYGEN SATURATION: 95 % | RESPIRATION RATE: 18 BRPM

## 2018-01-26 VITALS
RESPIRATION RATE: 16 BRPM | HEART RATE: 88 BPM | DIASTOLIC BLOOD PRESSURE: 71 MMHG | OXYGEN SATURATION: 96 % | SYSTOLIC BLOOD PRESSURE: 100 MMHG | TEMPERATURE: 98.24 F

## 2018-01-26 VITALS — HEART RATE: 110 BPM

## 2018-01-26 RX ADMIN — APIXABAN 2.5 MG: 2.5 TABLET, FILM COATED ORAL at 10:00

## 2018-01-26 RX ADMIN — SENNOSIDES 2 TABLET: 8.6 TABLET, FILM COATED ORAL at 15:18

## 2018-01-26 RX ADMIN — SODIUM CHLORIDE 15 ML: 9 INJECTION, SOLUTION INTRAVENOUS at 06:37

## 2018-01-26 RX ADMIN — SODIUM CHLORIDE, PRESERVATIVE FREE 0 ML: 5 INJECTION INTRAVENOUS at 06:14

## 2018-02-23 ENCOUNTER — HOSPITAL ENCOUNTER (OUTPATIENT)
Age: 68
End: 2018-02-23
Payer: MEDICARE

## 2018-02-23 DIAGNOSIS — I50.9: ICD-10-CM

## 2018-02-23 DIAGNOSIS — I48.91: Primary | ICD-10-CM

## 2018-02-23 LAB
ANION GAP: 7 (ref 5–15)
BUN SERPL-MCNC: 25 MG/DL (ref 7–18)
BUN/CREAT RATIO: 20.5 RATIO (ref 10–20)
CALCIUM SERPL-MCNC: 9.1 MG/DL (ref 8.5–10.1)
CARBON DIOXIDE: 28 MMOL/L (ref 21–32)
CHLORIDE: 106 MMOL/L (ref 98–107)
EST GLOM FILT RATE - AFR AMER: 77 ML/MIN (ref 60–?)
GLUCOSE: 83 MG/DL (ref 74–106)
POTASSIUM: 4.2 MMOL/L (ref 3.5–5.1)

## 2018-02-23 PROCEDURE — 80048 BASIC METABOLIC PNL TOTAL CA: CPT

## 2018-02-23 PROCEDURE — 36415 COLL VENOUS BLD VENIPUNCTURE: CPT

## 2018-05-16 ENCOUNTER — HOSPITAL ENCOUNTER (OUTPATIENT)
Age: 68
End: 2018-05-16
Payer: MEDICARE

## 2018-05-16 DIAGNOSIS — I43: ICD-10-CM

## 2018-05-16 DIAGNOSIS — I50.9: Primary | ICD-10-CM

## 2018-05-16 PROCEDURE — 93306 TTE W/DOPPLER COMPLETE: CPT

## 2018-05-23 ENCOUNTER — HOSPITAL ENCOUNTER (OUTPATIENT)
Age: 68
End: 2018-05-23
Payer: MEDICARE

## 2018-05-23 DIAGNOSIS — I43: ICD-10-CM

## 2018-05-23 DIAGNOSIS — I48.91: ICD-10-CM

## 2018-05-23 DIAGNOSIS — I50.9: Primary | ICD-10-CM

## 2018-05-23 DIAGNOSIS — Z79.899: ICD-10-CM

## 2018-05-23 LAB
ALANINE AMINOTRANSFER ALT/SGPT: 29 U/L (ref 16–61)
ALBUMIN SERPL-MCNC: 3.8 G/DL (ref 3.2–5)
ALKALINE PHOSPHATASE: 62 U/L (ref 45–117)
ANION GAP: 6 (ref 5–15)
AST(SGOT): 17 U/L (ref 15–37)
BILIRUB DIRECT SERPL-MCNC: < 0.05 MG/DL (ref 0–0.3)
BUN SERPL-MCNC: 17 MG/DL (ref 7–18)
BUN/CREAT RATIO: 12.2 RATIO (ref 10–20)
CALCIUM SERPL-MCNC: 9 MG/DL (ref 8.5–10.1)
CARBON DIOXIDE: 28 MMOL/L (ref 21–32)
CHLORIDE: 107 MMOL/L (ref 98–107)
EST GLOM FILT RATE - AFR AMER: 66 ML/MIN (ref 60–?)
GLOBULIN: 3.7 G/DL (ref 2.2–4.2)
GLUCOSE: 94 MG/DL (ref 74–106)
POTASSIUM: 4.9 MMOL/L (ref 3.5–5.1)
T4 SERPL-MCNC: 9.2 UG/DL (ref 4.5–12.1)

## 2018-05-23 PROCEDURE — 84443 ASSAY THYROID STIM HORMONE: CPT

## 2018-05-23 PROCEDURE — 84436 ASSAY OF TOTAL THYROXINE: CPT

## 2018-05-23 PROCEDURE — 80048 BASIC METABOLIC PNL TOTAL CA: CPT

## 2018-05-23 PROCEDURE — 80076 HEPATIC FUNCTION PANEL: CPT

## 2018-08-22 ENCOUNTER — HOSPITAL ENCOUNTER (OUTPATIENT)
Age: 68
End: 2018-08-22
Payer: MEDICARE

## 2018-08-22 DIAGNOSIS — I42.9: ICD-10-CM

## 2018-08-22 DIAGNOSIS — I10: Primary | ICD-10-CM

## 2018-08-22 LAB
ANION GAP: 9 (ref 5–15)
BUN SERPL-MCNC: 22 MG/DL (ref 7–18)
BUN/CREAT RATIO: 15.7 RATIO (ref 10–20)
CALCIUM SERPL-MCNC: 9 MG/DL (ref 8.5–10.1)
CARBON DIOXIDE: 26 MMOL/L (ref 21–32)
CHLORIDE: 106 MMOL/L (ref 98–107)
EST GLOM FILT RATE - AFR AMER: 65 ML/MIN (ref 60–?)
GLUCOSE: 128 MG/DL (ref 74–106)
MAGNESIUM: 2.3 MG/DL (ref 1.6–2.6)
POTASSIUM: 4.1 MMOL/L (ref 3.5–5.1)

## 2018-08-22 PROCEDURE — 83735 ASSAY OF MAGNESIUM: CPT

## 2018-08-22 PROCEDURE — 36415 COLL VENOUS BLD VENIPUNCTURE: CPT

## 2018-08-22 PROCEDURE — 80048 BASIC METABOLIC PNL TOTAL CA: CPT

## 2019-01-14 ENCOUNTER — HOSPITAL ENCOUNTER (OUTPATIENT)
Age: 69
End: 2019-01-14
Payer: MEDICARE

## 2019-01-14 VITALS — BODY MASS INDEX: 30.6 KG/M2

## 2019-01-14 DIAGNOSIS — Z79.899: Primary | ICD-10-CM

## 2019-01-14 LAB — T4 SERPL-MCNC: 8.7 UG/DL (ref 4.5–12.1)

## 2019-01-14 PROCEDURE — 84443 ASSAY THYROID STIM HORMONE: CPT

## 2019-01-14 PROCEDURE — 84436 ASSAY OF TOTAL THYROXINE: CPT

## 2019-01-14 PROCEDURE — 36415 COLL VENOUS BLD VENIPUNCTURE: CPT

## 2019-01-22 ENCOUNTER — HOSPITAL ENCOUNTER (OUTPATIENT)
Age: 69
End: 2019-01-22
Payer: MEDICARE

## 2019-01-22 VITALS — BODY MASS INDEX: 30.6 KG/M2

## 2019-01-22 DIAGNOSIS — I50.9: Primary | ICD-10-CM

## 2019-01-22 DIAGNOSIS — J44.9: ICD-10-CM

## 2019-01-22 DIAGNOSIS — I43: ICD-10-CM

## 2019-01-22 DIAGNOSIS — Z79.899: ICD-10-CM

## 2019-01-22 PROCEDURE — 94729 DIFFUSING CAPACITY: CPT

## 2019-01-22 PROCEDURE — 71046 X-RAY EXAM CHEST 2 VIEWS: CPT

## 2019-01-22 PROCEDURE — 94060 EVALUATION OF WHEEZING: CPT

## 2019-01-22 PROCEDURE — 94726 PLETHYSMOGRAPHY LUNG VOLUMES: CPT

## 2019-01-25 ENCOUNTER — HOSPITAL ENCOUNTER (OUTPATIENT)
Age: 69
End: 2019-01-25
Payer: MEDICARE

## 2019-01-25 VITALS — BODY MASS INDEX: 30.6 KG/M2

## 2019-01-25 DIAGNOSIS — I43: ICD-10-CM

## 2019-01-25 DIAGNOSIS — I25.10: ICD-10-CM

## 2019-01-25 DIAGNOSIS — I50.9: Primary | ICD-10-CM

## 2019-01-25 LAB
ANION GAP: 6 (ref 5–15)
APTT PPP: 32.6 SECONDS (ref 24.1–36.2)
BUN SERPL-MCNC: 14 MG/DL (ref 7–18)
BUN/CREAT RATIO: 11.4 RATIO (ref 10–20)
CALCIUM SERPL-MCNC: 9.1 MG/DL (ref 8.5–10.1)
CARBON DIOXIDE: 27 MMOL/L (ref 21–32)
CHLORIDE: 110 MMOL/L (ref 98–107)
DEPRECATED RDW RBC: 45.6 FL (ref 35.1–43.9)
ERYTHROCYTE [DISTWIDTH] IN BLOOD: 13.5 % (ref 11.6–14.6)
EST GLOM FILT RATE - AFR AMER: 76 ML/MIN (ref 60–?)
GLUCOSE: 91 MG/DL (ref 74–106)
HCT VFR BLD AUTO: 46.4 % (ref 40–54)
HEMOGLOBIN: 15.4 G/DL (ref 13–16.5)
HGB BLD-MCNC: 15.4 G/DL (ref 13–16.5)
MCV RBC: 94.9 FL (ref 80–94)
MEAN CORP HGB CONC: 33.2 G/GL (ref 32–36)
MEAN PLATELET VOL.: 10.4 FL (ref 6.2–12)
PLATELET # BLD: 217 K/MM3 (ref 150–450)
PLATELET COUNT: 217 K/MM3 (ref 150–450)
POTASSIUM: 4.4 MMOL/L (ref 3.5–5.1)
PROTHROMBIN TIME (PROTIME)PT.: 13.2 SECONDS (ref 11.7–14.9)
RBC # BLD AUTO: 4.89 M/MM3 (ref 4.6–6.2)
RBC DISTRIBUTION WIDTH CV: 13.5 % (ref 11.6–14.6)
RBC DISTRIBUTION WIDTH SD: 45.6 FL (ref 35.1–43.9)
SCAN INDICATED ON CBC? Y/N: NO
WBC # BLD AUTO: 9 K/MM3 (ref 4.4–11)
WHITE BLOOD COUNT: 9 K/MM3 (ref 4.4–11)

## 2019-01-25 PROCEDURE — 93017 CV STRESS TEST TRACING ONLY: CPT

## 2019-01-25 PROCEDURE — 93350 STRESS TTE ONLY: CPT

## 2019-01-28 ENCOUNTER — HOSPITAL ENCOUNTER (OUTPATIENT)
Age: 69
End: 2019-01-28
Payer: MEDICARE

## 2019-01-28 VITALS — BODY MASS INDEX: 30.6 KG/M2

## 2019-01-28 DIAGNOSIS — I25.10: Primary | ICD-10-CM

## 2019-01-28 DIAGNOSIS — Z79.899: ICD-10-CM

## 2019-01-28 LAB
ALANINE AMINOTRANSFER ALT/SGPT: 24 U/L (ref 16–61)
ALBUMIN SERPL-MCNC: 3.8 G/DL (ref 3.2–5)
ALKALINE PHOSPHATASE: 62 U/L (ref 45–117)
AST(SGOT): 14 U/L (ref 15–37)
BILIRUB DIRECT SERPL-MCNC: 0.14 MG/DL (ref 0–0.3)
CHOLEST SERPL-MCNC: 148 MG/DL
GLOBULIN: 3.6 G/DL (ref 2.2–4.2)
TRIGLYCERIDES: 183 MG/DL
VLDLC SERPL-MCNC: 37 MG/DL (ref 5–40)

## 2019-01-28 PROCEDURE — 36415 COLL VENOUS BLD VENIPUNCTURE: CPT

## 2019-01-28 PROCEDURE — 80061 LIPID PANEL: CPT

## 2019-01-28 PROCEDURE — 80076 HEPATIC FUNCTION PANEL: CPT

## 2019-02-06 ENCOUNTER — HOSPITAL ENCOUNTER (OUTPATIENT)
Dept: HOSPITAL 100 - CLSP | Age: 69
LOS: 1 days | Discharge: HOME | End: 2019-02-07
Payer: MEDICARE

## 2019-02-06 VITALS
DIASTOLIC BLOOD PRESSURE: 55 MMHG | SYSTOLIC BLOOD PRESSURE: 169 MMHG | OXYGEN SATURATION: 97 % | HEART RATE: 60 BPM | RESPIRATION RATE: 21 BRPM

## 2019-02-06 VITALS
DIASTOLIC BLOOD PRESSURE: 59 MMHG | SYSTOLIC BLOOD PRESSURE: 154 MMHG | OXYGEN SATURATION: 98 % | RESPIRATION RATE: 19 BRPM | HEART RATE: 67 BPM

## 2019-02-06 VITALS
DIASTOLIC BLOOD PRESSURE: 64 MMHG | RESPIRATION RATE: 18 BRPM | HEART RATE: 57 BPM | SYSTOLIC BLOOD PRESSURE: 151 MMHG | OXYGEN SATURATION: 98 %

## 2019-02-06 VITALS
DIASTOLIC BLOOD PRESSURE: 89 MMHG | RESPIRATION RATE: 13 BRPM | HEART RATE: 62 BPM | OXYGEN SATURATION: 99 % | SYSTOLIC BLOOD PRESSURE: 164 MMHG

## 2019-02-06 VITALS
SYSTOLIC BLOOD PRESSURE: 160 MMHG | DIASTOLIC BLOOD PRESSURE: 65 MMHG | HEART RATE: 56 BPM | OXYGEN SATURATION: 97 % | RESPIRATION RATE: 16 BRPM

## 2019-02-06 VITALS
RESPIRATION RATE: 14 BRPM | HEART RATE: 46 BPM | OXYGEN SATURATION: 98 % | DIASTOLIC BLOOD PRESSURE: 62 MMHG | SYSTOLIC BLOOD PRESSURE: 174 MMHG

## 2019-02-06 VITALS
DIASTOLIC BLOOD PRESSURE: 73 MMHG | OXYGEN SATURATION: 98 % | RESPIRATION RATE: 16 BRPM | HEART RATE: 55 BPM | TEMPERATURE: 98.42 F | SYSTOLIC BLOOD PRESSURE: 130 MMHG

## 2019-02-06 VITALS
SYSTOLIC BLOOD PRESSURE: 175 MMHG | HEART RATE: 62 BPM | DIASTOLIC BLOOD PRESSURE: 78 MMHG | RESPIRATION RATE: 14 BRPM | OXYGEN SATURATION: 98 %

## 2019-02-06 VITALS
RESPIRATION RATE: 22 BRPM | OXYGEN SATURATION: 98 % | DIASTOLIC BLOOD PRESSURE: 57 MMHG | HEART RATE: 60 BPM | SYSTOLIC BLOOD PRESSURE: 151 MMHG

## 2019-02-06 VITALS
DIASTOLIC BLOOD PRESSURE: 77 MMHG | RESPIRATION RATE: 13 BRPM | SYSTOLIC BLOOD PRESSURE: 160 MMHG | HEART RATE: 53 BPM | OXYGEN SATURATION: 99 %

## 2019-02-06 VITALS
HEART RATE: 56 BPM | DIASTOLIC BLOOD PRESSURE: 71 MMHG | RESPIRATION RATE: 23 BRPM | OXYGEN SATURATION: 98 % | SYSTOLIC BLOOD PRESSURE: 170 MMHG | TEMPERATURE: 98 F

## 2019-02-06 VITALS — OXYGEN SATURATION: 95 %

## 2019-02-06 VITALS — BODY MASS INDEX: 30.6 KG/M2 | BODY MASS INDEX: 30.2 KG/M2

## 2019-02-06 VITALS
RESPIRATION RATE: 23 BRPM | DIASTOLIC BLOOD PRESSURE: 60 MMHG | SYSTOLIC BLOOD PRESSURE: 153 MMHG | HEART RATE: 58 BPM | OXYGEN SATURATION: 98 %

## 2019-02-06 VITALS — HEART RATE: 54 BPM

## 2019-02-06 VITALS — HEART RATE: 61 BPM | DIASTOLIC BLOOD PRESSURE: 78 MMHG | SYSTOLIC BLOOD PRESSURE: 175 MMHG

## 2019-02-06 VITALS
RESPIRATION RATE: 12 BRPM | OXYGEN SATURATION: 98 % | HEART RATE: 54 BPM | SYSTOLIC BLOOD PRESSURE: 130 MMHG | DIASTOLIC BLOOD PRESSURE: 73 MMHG

## 2019-02-06 VITALS
DIASTOLIC BLOOD PRESSURE: 84 MMHG | HEART RATE: 60 BPM | RESPIRATION RATE: 19 BRPM | OXYGEN SATURATION: 98 % | SYSTOLIC BLOOD PRESSURE: 155 MMHG

## 2019-02-06 VITALS
OXYGEN SATURATION: 93 % | DIASTOLIC BLOOD PRESSURE: 64 MMHG | RESPIRATION RATE: 12 BRPM | SYSTOLIC BLOOD PRESSURE: 144 MMHG | HEART RATE: 59 BPM

## 2019-02-06 VITALS — HEART RATE: 57 BPM

## 2019-02-06 VITALS — HEART RATE: 59 BPM

## 2019-02-06 DIAGNOSIS — Z79.01: ICD-10-CM

## 2019-02-06 DIAGNOSIS — I11.0: ICD-10-CM

## 2019-02-06 DIAGNOSIS — Z87.19: ICD-10-CM

## 2019-02-06 DIAGNOSIS — I43: ICD-10-CM

## 2019-02-06 DIAGNOSIS — I25.110: Primary | ICD-10-CM

## 2019-02-06 DIAGNOSIS — I48.91: ICD-10-CM

## 2019-02-06 DIAGNOSIS — Z79.899: ICD-10-CM

## 2019-02-06 DIAGNOSIS — E78.5: ICD-10-CM

## 2019-02-06 DIAGNOSIS — Z98.52: ICD-10-CM

## 2019-02-06 DIAGNOSIS — I10: ICD-10-CM

## 2019-02-06 LAB — ACT BLD: 197 SEC (ref 74–137)

## 2019-02-06 PROCEDURE — 85610 PROTHROMBIN TIME: CPT

## 2019-02-06 PROCEDURE — C1894 INTRO/SHEATH, NON-LASER: HCPCS

## 2019-02-06 PROCEDURE — 97802 MEDICAL NUTRITION INDIV IN: CPT

## 2019-02-06 PROCEDURE — C1760 CLOSURE DEV, VASC: HCPCS

## 2019-02-06 PROCEDURE — 93005 ELECTROCARDIOGRAM TRACING: CPT

## 2019-02-06 PROCEDURE — 99153 MOD SED SAME PHYS/QHP EA: CPT

## 2019-02-06 PROCEDURE — C1725 CATH, TRANSLUMIN NON-LASER: HCPCS

## 2019-02-06 PROCEDURE — C1887 CATHETER, GUIDING: HCPCS

## 2019-02-06 PROCEDURE — 80048 BASIC METABOLIC PNL TOTAL CA: CPT

## 2019-02-06 PROCEDURE — 92928 PRQ TCAT PLMT NTRAC ST 1 LES: CPT

## 2019-02-06 PROCEDURE — C1769 GUIDE WIRE: HCPCS

## 2019-02-06 PROCEDURE — C1874 STENT, COATED/COV W/DEL SYS: HCPCS

## 2019-02-06 PROCEDURE — 93458 L HRT ARTERY/VENTRICLE ANGIO: CPT

## 2019-02-06 PROCEDURE — 85347 COAGULATION TIME ACTIVATED: CPT

## 2019-02-06 PROCEDURE — C9600 PERC DRUG-EL COR STENT SING: HCPCS

## 2019-02-06 PROCEDURE — 99152 MOD SED SAME PHYS/QHP 5/>YRS: CPT

## 2019-02-06 PROCEDURE — 80061 LIPID PANEL: CPT

## 2019-02-06 PROCEDURE — 85027 COMPLETE CBC AUTOMATED: CPT

## 2019-02-06 PROCEDURE — 85730 THROMBOPLASTIN TIME PARTIAL: CPT

## 2019-02-06 PROCEDURE — 36415 COLL VENOUS BLD VENIPUNCTURE: CPT

## 2019-02-06 RX ADMIN — SODIUM CHLORIDE 150 ML: 9 INJECTION, SOLUTION INTRAVENOUS at 10:00

## 2019-02-07 VITALS
HEART RATE: 61 BPM | DIASTOLIC BLOOD PRESSURE: 68 MMHG | RESPIRATION RATE: 15 BRPM | OXYGEN SATURATION: 97 % | SYSTOLIC BLOOD PRESSURE: 133 MMHG

## 2019-02-07 VITALS
RESPIRATION RATE: 22 BRPM | SYSTOLIC BLOOD PRESSURE: 148 MMHG | OXYGEN SATURATION: 96 % | HEART RATE: 56 BPM | DIASTOLIC BLOOD PRESSURE: 67 MMHG

## 2019-02-07 VITALS
DIASTOLIC BLOOD PRESSURE: 61 MMHG | SYSTOLIC BLOOD PRESSURE: 126 MMHG | TEMPERATURE: 98.06 F | RESPIRATION RATE: 22 BRPM | OXYGEN SATURATION: 97 % | HEART RATE: 54 BPM

## 2019-02-07 VITALS
OXYGEN SATURATION: 96 % | SYSTOLIC BLOOD PRESSURE: 156 MMHG | DIASTOLIC BLOOD PRESSURE: 63 MMHG | HEART RATE: 55 BPM | RESPIRATION RATE: 18 BRPM

## 2019-02-07 VITALS
HEART RATE: 54 BPM | RESPIRATION RATE: 20 BRPM | OXYGEN SATURATION: 94 % | DIASTOLIC BLOOD PRESSURE: 67 MMHG | SYSTOLIC BLOOD PRESSURE: 142 MMHG

## 2019-02-07 VITALS
SYSTOLIC BLOOD PRESSURE: 149 MMHG | RESPIRATION RATE: 18 BRPM | TEMPERATURE: 98.06 F | OXYGEN SATURATION: 98 % | DIASTOLIC BLOOD PRESSURE: 64 MMHG | HEART RATE: 73 BPM

## 2019-02-07 VITALS — OXYGEN SATURATION: 96 %

## 2019-02-07 VITALS
SYSTOLIC BLOOD PRESSURE: 128 MMHG | OXYGEN SATURATION: 97 % | DIASTOLIC BLOOD PRESSURE: 55 MMHG | HEART RATE: 60 BPM | RESPIRATION RATE: 16 BRPM | TEMPERATURE: 98 F

## 2019-02-07 VITALS
RESPIRATION RATE: 18 BRPM | OXYGEN SATURATION: 95 % | SYSTOLIC BLOOD PRESSURE: 129 MMHG | DIASTOLIC BLOOD PRESSURE: 51 MMHG | HEART RATE: 50 BPM

## 2019-02-07 VITALS
RESPIRATION RATE: 20 BRPM | DIASTOLIC BLOOD PRESSURE: 40 MMHG | SYSTOLIC BLOOD PRESSURE: 121 MMHG | HEART RATE: 52 BPM | OXYGEN SATURATION: 97 %

## 2019-02-07 VITALS
HEART RATE: 53 BPM | DIASTOLIC BLOOD PRESSURE: 54 MMHG | RESPIRATION RATE: 22 BRPM | SYSTOLIC BLOOD PRESSURE: 139 MMHG | OXYGEN SATURATION: 98 %

## 2019-02-07 LAB
ANION GAP: 10 (ref 5–15)
BUN SERPL-MCNC: 15 MG/DL (ref 7–18)
BUN/CREAT RATIO: 12.3 RATIO (ref 10–20)
CALCIUM SERPL-MCNC: 8.2 MG/DL (ref 8.5–10.1)
CARBON DIOXIDE: 22 MMOL/L (ref 21–32)
CHLORIDE: 111 MMOL/L (ref 98–107)
CHOLEST SERPL-MCNC: 106 MG/DL
DEPRECATED RDW RBC: 46.6 FL (ref 35.1–43.9)
ERYTHROCYTE [DISTWIDTH] IN BLOOD: 13.7 % (ref 11.6–14.6)
EST GLOM FILT RATE - AFR AMER: 76 ML/MIN (ref 60–?)
ESTIMATED CREATININE CLEARANCE: 67.31 ML/MIN
GLUCOSE: 101 MG/DL (ref 74–106)
HCT VFR BLD AUTO: 41.2 % (ref 40–54)
HEMOGLOBIN: 13.3 G/DL (ref 13–16.5)
HGB BLD-MCNC: 13.3 G/DL (ref 13–16.5)
MCV RBC: 96.7 FL (ref 80–94)
MEAN CORP HGB CONC: 32.3 G/GL (ref 32–36)
MEAN PLATELET VOL.: 10.4 FL (ref 6.2–12)
PLATELET # BLD: 175 K/MM3 (ref 150–450)
PLATELET COUNT: 175 K/MM3 (ref 150–450)
POTASSIUM: 3.7 MMOL/L (ref 3.5–5.1)
RBC # BLD AUTO: 4.26 M/MM3 (ref 4.6–6.2)
RBC DISTRIBUTION WIDTH CV: 13.7 % (ref 11.6–14.6)
RBC DISTRIBUTION WIDTH SD: 46.6 FL (ref 35.1–43.9)
SCAN INDICATED ON CBC? Y/N: NO
TRIGLYCERIDES: 151 MG/DL
VLDLC SERPL-MCNC: 30 MG/DL (ref 5–40)
WBC # BLD AUTO: 10.3 K/MM3 (ref 4.4–11)
WHITE BLOOD COUNT: 10.3 K/MM3 (ref 4.4–11)

## 2019-02-07 RX ADMIN — ASPIRIN 81 MG: 81 TABLET, COATED ORAL at 09:31

## 2019-02-07 RX ADMIN — SENNOSIDES 2 TABLET: 8.6 TABLET, FILM COATED ORAL at 09:30

## 2019-02-12 ENCOUNTER — HOSPITAL ENCOUNTER (OUTPATIENT)
Age: 69
End: 2019-02-12
Payer: MEDICARE

## 2019-02-12 VITALS — BODY MASS INDEX: 30.2 KG/M2

## 2019-02-12 DIAGNOSIS — R09.89: Primary | ICD-10-CM

## 2019-02-12 PROCEDURE — 93926 LOWER EXTREMITY STUDY: CPT

## 2019-03-22 ENCOUNTER — HOSPITAL ENCOUNTER (OUTPATIENT)
Age: 69
End: 2019-03-22
Payer: MEDICARE

## 2019-03-22 VITALS — BODY MASS INDEX: 30.9 KG/M2

## 2019-03-22 DIAGNOSIS — Z87.891: Primary | ICD-10-CM

## 2019-03-22 PROCEDURE — G0297 LDCT FOR LUNG CA SCREEN: HCPCS

## 2020-01-27 ENCOUNTER — HOSPITAL ENCOUNTER (OUTPATIENT)
Age: 70
End: 2020-01-27
Payer: MEDICARE

## 2020-01-27 VITALS — BODY MASS INDEX: 29 KG/M2

## 2020-01-27 DIAGNOSIS — H90.A22: Primary | ICD-10-CM

## 2020-01-27 LAB — EST GLOM FILT RATE - AFR AMER: 80 ML/MIN (ref 60–?)

## 2020-01-27 PROCEDURE — 82565 ASSAY OF CREATININE: CPT

## 2020-01-27 PROCEDURE — 36415 COLL VENOUS BLD VENIPUNCTURE: CPT

## 2020-02-04 ENCOUNTER — HOSPITAL ENCOUNTER (OUTPATIENT)
Age: 70
End: 2020-02-04
Payer: MEDICARE

## 2020-02-04 VITALS — BODY MASS INDEX: 29 KG/M2

## 2020-02-04 DIAGNOSIS — H90.A22: Primary | ICD-10-CM

## 2020-02-04 PROCEDURE — A9575 INJ GADOTERATE MEGLUMI 0.1ML: HCPCS

## 2020-02-04 PROCEDURE — 70553 MRI BRAIN STEM W/O & W/DYE: CPT

## 2020-03-06 ENCOUNTER — HOSPITAL ENCOUNTER (OUTPATIENT)
Age: 70
End: 2020-03-06
Payer: MEDICARE

## 2020-03-06 VITALS — BODY MASS INDEX: 28.8 KG/M2

## 2020-03-06 DIAGNOSIS — E78.5: Primary | ICD-10-CM

## 2020-03-06 DIAGNOSIS — I25.10: ICD-10-CM

## 2020-03-06 DIAGNOSIS — Z79.899: ICD-10-CM

## 2020-03-06 LAB
ALANINE AMINOTRANSFER ALT/SGPT: 19 U/L (ref 16–61)
ALBUMIN SERPL-MCNC: 3.8 G/DL (ref 3.2–5)
ALKALINE PHOSPHATASE: 76 U/L (ref 45–117)
AST(SGOT): 13 U/L (ref 15–37)
BILIRUB DIRECT SERPL-MCNC: 0.13 MG/DL (ref 0–0.3)
CHOLEST SERPL-MCNC: 101 MG/DL
GLOBULIN: 3.3 G/DL (ref 2.2–4.2)
T4 SERPL-MCNC: 11.1 UG/DL (ref 4.5–12.1)
TRIGLYCERIDES: 73 MG/DL
VLDLC SERPL-MCNC: 15 MG/DL (ref 5–40)

## 2020-03-06 PROCEDURE — 36415 COLL VENOUS BLD VENIPUNCTURE: CPT

## 2020-03-06 PROCEDURE — 84443 ASSAY THYROID STIM HORMONE: CPT

## 2020-03-06 PROCEDURE — 80076 HEPATIC FUNCTION PANEL: CPT

## 2020-03-06 PROCEDURE — 80061 LIPID PANEL: CPT

## 2020-03-06 PROCEDURE — 84436 ASSAY OF TOTAL THYROXINE: CPT

## 2020-09-28 ENCOUNTER — HOSPITAL ENCOUNTER (OUTPATIENT)
Age: 70
End: 2020-09-28
Payer: MEDICARE

## 2020-09-28 VITALS — BODY MASS INDEX: 29.2 KG/M2

## 2020-09-28 DIAGNOSIS — E78.00: Primary | ICD-10-CM

## 2020-09-28 LAB
ALANINE AMINOTRANSFER ALT/SGPT: 23 U/L (ref 16–61)
ALBUMIN SERPL-MCNC: 3.8 G/DL (ref 3.2–5)
ALKALINE PHOSPHATASE: 68 U/L (ref 45–117)
AST(SGOT): 16 U/L (ref 15–37)
BILIRUB DIRECT SERPL-MCNC: 0.14 MG/DL (ref 0–0.3)
CHOLEST SERPL-MCNC: 109 MG/DL
GLOBULIN: 3.3 G/DL (ref 2.2–4.2)
TRIGLYCERIDES: 76 MG/DL
VLDLC SERPL-MCNC: 15 MG/DL (ref 5–40)

## 2020-09-28 PROCEDURE — 80076 HEPATIC FUNCTION PANEL: CPT

## 2020-09-28 PROCEDURE — 80061 LIPID PANEL: CPT

## 2020-09-28 PROCEDURE — 36415 COLL VENOUS BLD VENIPUNCTURE: CPT

## 2021-02-17 ENCOUNTER — HOSPITAL ENCOUNTER (OUTPATIENT)
Age: 71
End: 2021-02-17
Payer: MEDICARE

## 2021-02-17 VITALS — BODY MASS INDEX: 29.9 KG/M2

## 2021-02-17 DIAGNOSIS — I48.91: ICD-10-CM

## 2021-02-17 DIAGNOSIS — I25.10: Primary | ICD-10-CM

## 2021-02-17 DIAGNOSIS — I50.43: ICD-10-CM

## 2021-02-17 DIAGNOSIS — Z95.5: ICD-10-CM

## 2021-02-17 DIAGNOSIS — I48.92: ICD-10-CM

## 2021-02-17 DIAGNOSIS — I43: ICD-10-CM

## 2021-02-17 PROCEDURE — 71046 X-RAY EXAM CHEST 2 VIEWS: CPT

## 2021-02-23 ENCOUNTER — HOSPITAL ENCOUNTER (OUTPATIENT)
Age: 71
End: 2021-02-23
Payer: MEDICARE

## 2021-02-23 VITALS — BODY MASS INDEX: 29.9 KG/M2

## 2021-02-23 DIAGNOSIS — I48.91: ICD-10-CM

## 2021-02-23 DIAGNOSIS — I25.10: Primary | ICD-10-CM

## 2021-02-23 DIAGNOSIS — Z95.5: ICD-10-CM

## 2021-02-23 DIAGNOSIS — E78.00: ICD-10-CM

## 2021-02-23 DIAGNOSIS — I50.43: ICD-10-CM

## 2021-02-23 DIAGNOSIS — I43: ICD-10-CM

## 2021-02-23 DIAGNOSIS — I48.92: ICD-10-CM

## 2021-02-23 LAB
ALANINE AMINOTRANSFER ALT/SGPT: 19 U/L (ref 16–61)
ALBUMIN SERPL-MCNC: 3.9 G/DL (ref 3.2–5)
ALKALINE PHOSPHATASE: 77 U/L (ref 45–117)
AST(SGOT): 10 U/L (ref 15–37)
BILIRUB DIRECT SERPL-MCNC: 0.09 MG/DL (ref 0–0.3)
CHOLEST SERPL-MCNC: 121 MG/DL
GLOBULIN: 3.4 G/DL (ref 2.2–4.2)
TRIGLYCERIDES: 81 MG/DL
VLDLC SERPL-MCNC: 16 MG/DL (ref 5–40)

## 2021-02-23 PROCEDURE — 94729 DIFFUSING CAPACITY: CPT

## 2021-02-23 PROCEDURE — 36415 COLL VENOUS BLD VENIPUNCTURE: CPT

## 2021-02-23 PROCEDURE — 94726 PLETHYSMOGRAPHY LUNG VOLUMES: CPT

## 2021-02-23 PROCEDURE — 80061 LIPID PANEL: CPT

## 2021-02-23 PROCEDURE — 84443 ASSAY THYROID STIM HORMONE: CPT

## 2021-02-23 PROCEDURE — 84439 ASSAY OF FREE THYROXINE: CPT

## 2021-02-23 PROCEDURE — 94060 EVALUATION OF WHEEZING: CPT

## 2021-02-23 PROCEDURE — 80076 HEPATIC FUNCTION PANEL: CPT

## 2021-08-19 ENCOUNTER — HOSPITAL ENCOUNTER (OUTPATIENT)
Age: 71
End: 2021-08-19
Payer: MEDICARE

## 2021-08-19 DIAGNOSIS — I48.91: Primary | ICD-10-CM

## 2021-08-19 DIAGNOSIS — E78.00: ICD-10-CM

## 2021-08-19 LAB
ALANINE AMINOTRANSFER ALT/SGPT: 20 U/L (ref 16–61)
ALBUMIN SERPL-MCNC: 3.6 G/DL (ref 3.2–5)
ALKALINE PHOSPHATASE: 71 U/L (ref 45–117)
AST(SGOT): 10 U/L (ref 15–37)
BILIRUB DIRECT SERPL-MCNC: 0.11 MG/DL (ref 0–0.3)
CHOLEST SERPL-MCNC: 117 MG/DL
GLOBULIN: 3.5 G/DL (ref 2.2–4.2)
TRIGLYCERIDES: 84 MG/DL
VLDLC SERPL-MCNC: 17 MG/DL (ref 5–40)

## 2021-08-19 PROCEDURE — 36415 COLL VENOUS BLD VENIPUNCTURE: CPT

## 2021-08-19 PROCEDURE — 80076 HEPATIC FUNCTION PANEL: CPT

## 2021-08-19 PROCEDURE — 84443 ASSAY THYROID STIM HORMONE: CPT

## 2021-08-19 PROCEDURE — 80061 LIPID PANEL: CPT

## 2021-08-19 PROCEDURE — 84439 ASSAY OF FREE THYROXINE: CPT

## 2022-03-31 ENCOUNTER — HOSPITAL ENCOUNTER (OUTPATIENT)
Age: 72
Discharge: HOME | End: 2022-03-31
Payer: MEDICARE

## 2022-03-31 DIAGNOSIS — I48.92: ICD-10-CM

## 2022-03-31 DIAGNOSIS — I48.91: Primary | ICD-10-CM

## 2022-03-31 PROCEDURE — 71046 X-RAY EXAM CHEST 2 VIEWS: CPT

## 2022-03-31 PROCEDURE — 94729 DIFFUSING CAPACITY: CPT

## 2022-03-31 PROCEDURE — 94726 PLETHYSMOGRAPHY LUNG VOLUMES: CPT

## 2022-03-31 PROCEDURE — 94060 EVALUATION OF WHEEZING: CPT

## 2022-08-11 ENCOUNTER — HOSPITAL ENCOUNTER (OUTPATIENT)
Dept: HOSPITAL 100 - LAB | Age: 72
Discharge: HOME | End: 2022-08-11
Payer: MEDICARE

## 2022-08-11 DIAGNOSIS — E78.00: Primary | ICD-10-CM

## 2022-08-11 LAB
ALANINE AMINOTRANSFER ALT/SGPT: 16 U/L (ref 16–61)
ALBUMIN SERPL-MCNC: 3.5 G/DL (ref 3.2–5)
ALKALINE PHOSPHATASE: 69 U/L (ref 45–117)
AST(SGOT): 12 U/L (ref 15–37)
BILIRUB DIRECT SERPL-MCNC: 0.13 MG/DL (ref 0–0.3)
CHOLEST SERPL-MCNC: 109 MG/DL
GLOBULIN: 3.4 G/DL (ref 2.2–4.2)
TRIGLYCERIDES: 88 MG/DL
VLDLC SERPL-MCNC: 18 MG/DL (ref 5–40)

## 2022-08-11 PROCEDURE — 80076 HEPATIC FUNCTION PANEL: CPT

## 2022-08-11 PROCEDURE — 36415 COLL VENOUS BLD VENIPUNCTURE: CPT

## 2022-08-11 PROCEDURE — 80061 LIPID PANEL: CPT

## 2022-08-22 ENCOUNTER — HOSPITAL ENCOUNTER (OUTPATIENT)
Age: 72
Discharge: HOME | End: 2022-08-22
Payer: MEDICARE

## 2022-08-22 DIAGNOSIS — I42.9: ICD-10-CM

## 2022-08-22 DIAGNOSIS — Z12.5: Primary | ICD-10-CM

## 2022-08-22 LAB
ANION GAP: 6 (ref 5–15)
BUN SERPL-MCNC: 20 MG/DL (ref 7–18)
BUN/CREAT RATIO: 15.7 RATIO (ref 10–20)
CALCIUM SERPL-MCNC: 8.6 MG/DL (ref 8.5–10.1)
CARBON DIOXIDE: 24 MMOL/L (ref 21–32)
CHLORIDE: 113 MMOL/L (ref 98–107)
DEPRECATED RDW RBC: 48.4 FL (ref 35.1–43.9)
ERYTHROCYTE [DISTWIDTH] IN BLOOD: 13.8 % (ref 11.6–14.6)
EST GLOM FILT RATE - AFR AMER: 72 ML/MIN (ref 60–?)
GLUCOSE: 104 MG/DL (ref 74–106)
HCT VFR BLD AUTO: 42 % (ref 40–54)
HEMOGLOBIN: 13.9 G/DL (ref 13–16.5)
HGB BLD-MCNC: 13.9 G/DL (ref 13–16.5)
IMMATURE GRANULOCYTES COUNT: 0.03 X10^3/UL (ref 0–0)
MCV RBC: 95.2 FL (ref 80–94)
MEAN CORP HGB CONC: 33.1 G/DL (ref 32–36)
MEAN PLATELET VOL.: 11 FL (ref 6.2–12)
NRBC FLAGGED BY ANALYZER: 0 % (ref 0–5)
PLATELET # BLD: 233 K/MM3 (ref 150–450)
PLATELET COUNT: 233 K/MM3 (ref 150–450)
POTASSIUM: 4.2 MMOL/L (ref 3.5–5.1)
PSA,TOTAL - ANNUAL SCREEN: 0.7 NG/ML (ref 0–4)
RBC # BLD AUTO: 4.41 M/MM3 (ref 4.6–6.2)
RBC DISTRIBUTION WIDTH CV: 13.8 % (ref 11.6–14.6)
RBC DISTRIBUTION WIDTH SD: 48.4 FL (ref 35.1–43.9)
WBC # BLD AUTO: 8.5 K/MM3 (ref 4.4–11)
WHITE BLOOD COUNT: 8.5 K/MM3 (ref 4.4–11)

## 2022-08-22 PROCEDURE — 84153 ASSAY OF PSA TOTAL: CPT

## 2022-08-22 PROCEDURE — 84443 ASSAY THYROID STIM HORMONE: CPT

## 2022-08-22 PROCEDURE — 36415 COLL VENOUS BLD VENIPUNCTURE: CPT

## 2022-08-22 PROCEDURE — G0103 PSA SCREENING: HCPCS

## 2022-08-22 PROCEDURE — 80048 BASIC METABOLIC PNL TOTAL CA: CPT

## 2022-08-22 PROCEDURE — 85025 COMPLETE CBC W/AUTO DIFF WBC: CPT

## 2023-09-29 ENCOUNTER — HOSPITAL ENCOUNTER (OUTPATIENT)
Dept: HOSPITAL 100 - LAB | Age: 73
Discharge: HOME | End: 2023-09-29
Payer: MEDICARE

## 2023-09-29 DIAGNOSIS — E78.00: ICD-10-CM

## 2023-09-29 DIAGNOSIS — I48.92: ICD-10-CM

## 2023-09-29 DIAGNOSIS — Z79.899: Primary | ICD-10-CM

## 2023-09-29 DIAGNOSIS — I10: ICD-10-CM

## 2023-09-29 LAB
ALANINE AMINOTRANSFER ALT/SGPT: 20 U/L (ref 16–61)
ALBUMIN SERPL-MCNC: 3.5 G/DL (ref 3.2–5)
ALKALINE PHOSPHATASE: 85 U/L (ref 45–117)
ANION GAP: 3 (ref 5–15)
AST(SGOT): 10 U/L (ref 15–37)
BUN SERPL-MCNC: 15 MG/DL (ref 7–18)
BUN/CREAT RATIO: 12.6 RATIO (ref 10–20)
CALCIUM SERPL-MCNC: 8.1 MG/DL (ref 8.5–10.1)
CARBON DIOXIDE: 25 MMOL/L (ref 21–32)
CHLORIDE: 113 MMOL/L (ref 98–107)
CHOLEST SERPL-MCNC: 102 MG/DL
CREAT UR-MCNC: 190 MG/DL
DEPRECATED RDW RBC: 49.2 FL (ref 35.1–43.9)
ERYTHROCYTE [DISTWIDTH] IN BLOOD: 13.9 % (ref 11.6–14.6)
EST GLOM FILT RATE - AFR AMER: 78 ML/MIN (ref 60–?)
GLOBULIN: 3.4 G/DL (ref 2.2–4.2)
GLUCOSE: 96 MG/DL (ref 74–106)
HCT VFR BLD AUTO: 41.9 % (ref 40–54)
HEMOGLOBIN: 13.4 G/DL (ref 13–16.5)
HGB BLD-MCNC: 13.4 G/DL (ref 13–16.5)
IMMATURE GRANULOCYTES COUNT: 0.02 X10^3/UL (ref 0–0)
MCV RBC: 96.3 FL (ref 80–94)
MEAN CORP HGB CONC: 32 G/DL (ref 32–36)
MEAN PLATELET VOL.: 10.3 FL (ref 6.2–12)
MICROALBUMIN UR-MCNC: 41.5 MG/L
NRBC FLAGGED BY ANALYZER: 0 % (ref 0–5)
PLATELET # BLD: 231 K/MM3 (ref 150–450)
PLATELET COUNT: 231 K/MM3 (ref 150–450)
POTASSIUM: 4.2 MMOL/L (ref 3.5–5.1)
RBC # BLD AUTO: 4.35 M/MM3 (ref 4.6–6.2)
RBC DISTRIBUTION WIDTH CV: 13.9 % (ref 11.6–14.6)
RBC DISTRIBUTION WIDTH SD: 49.2 FL (ref 35.1–43.9)
TRIGLYCERIDES: 80 MG/DL
VLDLC SERPL-MCNC: 16 MG/DL (ref 5–40)
WBC # BLD AUTO: 7.9 K/MM3 (ref 4.4–11)
WHITE BLOOD COUNT: 7.9 K/MM3 (ref 4.4–11)

## 2023-09-29 PROCEDURE — 82570 ASSAY OF URINE CREATININE: CPT

## 2023-09-29 PROCEDURE — 80053 COMPREHEN METABOLIC PANEL: CPT

## 2023-09-29 PROCEDURE — 84443 ASSAY THYROID STIM HORMONE: CPT

## 2023-09-29 PROCEDURE — 36415 COLL VENOUS BLD VENIPUNCTURE: CPT

## 2023-09-29 PROCEDURE — 85025 COMPLETE CBC W/AUTO DIFF WBC: CPT

## 2023-09-29 PROCEDURE — 80061 LIPID PANEL: CPT

## 2023-09-29 PROCEDURE — 71046 X-RAY EXAM CHEST 2 VIEWS: CPT

## 2023-09-29 PROCEDURE — 82043 UR ALBUMIN QUANTITATIVE: CPT

## 2024-06-24 ENCOUNTER — HOSPITAL ENCOUNTER (OUTPATIENT)
Dept: HOSPITAL 100 - PSN | Age: 74
Discharge: HOME | End: 2024-06-24
Payer: MEDICARE

## 2024-06-24 DIAGNOSIS — Z79.899: Primary | ICD-10-CM

## 2024-06-24 PROCEDURE — 94060 EVALUATION OF WHEEZING: CPT

## 2024-06-24 PROCEDURE — 94726 PLETHYSMOGRAPHY LUNG VOLUMES: CPT

## 2024-06-24 PROCEDURE — 94729 DIFFUSING CAPACITY: CPT

## 2024-07-03 ENCOUNTER — HOSPITAL ENCOUNTER (OUTPATIENT)
Dept: HOSPITAL 100 - LAB | Age: 74
Discharge: HOME | End: 2024-07-03
Payer: MEDICARE

## 2024-07-03 DIAGNOSIS — M54.50: Primary | ICD-10-CM

## 2024-07-03 DIAGNOSIS — Z79.899: ICD-10-CM

## 2024-07-03 DIAGNOSIS — R82.90: ICD-10-CM

## 2024-07-03 DIAGNOSIS — E78.00: ICD-10-CM

## 2024-07-03 LAB
ALANINE AMINOTRANSFER ALT/SGPT: 19 U/L (ref 16–61)
ALANINE AMINOTRANSFER ALT/SGPT: 22 U/L (ref 16–61)
ALBUMIN SERPL-MCNC: 3.7 G/DL (ref 3.2–5)
ALBUMIN SERPL-MCNC: 3.7 G/DL (ref 3.2–5)
ALKALINE PHOSPHATASE: 87 U/L (ref 45–117)
ALKALINE PHOSPHATASE: 88 U/L (ref 45–117)
ANION GAP: 7 (ref 5–15)
AST(SGOT): 12 U/L (ref 15–37)
AST(SGOT): 13 U/L (ref 15–37)
BILIRUB DIRECT SERPL-MCNC: 0.15 MG/DL (ref 0–0.3)
BUN SERPL-MCNC: 12 MG/DL (ref 7–18)
BUN/CREAT RATIO: 10.9 RATIO (ref 10–20)
CALCIUM SERPL-MCNC: 8.6 MG/DL (ref 8.5–10.1)
CARBON DIOXIDE: 23 MMOL/L (ref 21–32)
CHLORIDE: 108 MMOL/L (ref 98–107)
CHOLEST SERPL-MCNC: 145 MG/DL
CRP SERPL-MCNC: < 2.9 MG/L (ref 0–3)
DEPRECATED RDW RBC: 48.7 FL (ref 35.1–43.9)
ERYTHROCYTE [DISTWIDTH] IN BLOOD: 14 % (ref 11.6–14.6)
EST GLOM FILT RATE - AFR AMER: 85 ML/MIN (ref 60–?)
GLOBULIN: 3.5 G/DL (ref 2.2–4.2)
GLOBULIN: 3.5 G/DL (ref 2.2–4.2)
GLUCOSE: 98 MG/DL (ref 74–106)
HCT VFR BLD AUTO: 46.1 % (ref 40–54)
HEMOGLOBIN: 14.7 G/DL (ref 13–16.5)
HGB BLD-MCNC: 14.7 G/DL (ref 13–16.5)
IMMATURE GRANULOCYTES COUNT: 0.02 X10^3/UL (ref 0–0)
LIPASE: 30 U/L (ref 13–75)
MAGNESIUM: 2.9 MG/DL (ref 1.6–2.6)
MCV RBC: 94.7 FL (ref 80–94)
MEAN CORP HGB CONC: 31.9 G/DL (ref 32–36)
MEAN PLATELET VOL.: 10.4 FL (ref 6.2–12)
MUCOUS THREADS URNS QL MICRO: (no result) /HPF
NRBC FLAGGED BY ANALYZER: 0 % (ref 0–5)
PLATELET # BLD: 206 K/MM3 (ref 150–450)
PLATELET COUNT: 206 K/MM3 (ref 150–450)
POTASSIUM: 4 MMOL/L (ref 3.5–5.1)
RBC # BLD AUTO: 4.87 M/MM3 (ref 4.6–6.2)
RBC DISTRIBUTION WIDTH CV: 14 % (ref 11.6–14.6)
RBC DISTRIBUTION WIDTH SD: 48.7 FL (ref 35.1–43.9)
RBC UR QL: (no result) /HPF (ref 0–5)
SP GR UR: 1.01 (ref 1–1.03)
SQUAMOUS URNS QL MICRO: (no result) /HPF (ref 0–5)
TRIGLYCERIDES: 125 MG/DL
URINE PRESERVATIVE: (no result)
VLDLC SERPL-MCNC: 25 MG/DL (ref 5–40)
WBC # BLD AUTO: 8.3 K/MM3 (ref 4.4–11)
WHITE BLOOD COUNT: 8.3 K/MM3 (ref 4.4–11)

## 2024-07-03 PROCEDURE — 80076 HEPATIC FUNCTION PANEL: CPT

## 2024-07-03 PROCEDURE — 36415 COLL VENOUS BLD VENIPUNCTURE: CPT

## 2024-07-03 PROCEDURE — 84165 PROTEIN E-PHORESIS SERUM: CPT

## 2024-07-03 PROCEDURE — 80061 LIPID PANEL: CPT

## 2024-07-03 PROCEDURE — 86140 C-REACTIVE PROTEIN: CPT

## 2024-07-03 PROCEDURE — 84439 ASSAY OF FREE THYROXINE: CPT

## 2024-07-03 PROCEDURE — 80053 COMPREHEN METABOLIC PANEL: CPT

## 2024-07-03 PROCEDURE — 85025 COMPLETE CBC W/AUTO DIFF WBC: CPT

## 2024-07-03 PROCEDURE — 83735 ASSAY OF MAGNESIUM: CPT

## 2024-07-03 PROCEDURE — 83690 ASSAY OF LIPASE: CPT

## 2024-07-03 PROCEDURE — 84443 ASSAY THYROID STIM HORMONE: CPT

## 2024-07-03 PROCEDURE — 81001 URINALYSIS AUTO W/SCOPE: CPT

## 2024-07-05 LAB
ALBUMIN SERPL ELPH-MCNC: 3.7 G/DL (ref 2.9–4.4)
GLOBULIN SER-MCNC: 2.9 G/DL (ref 2.2–3.9)
PROEL- A/G RATIO: 1.3 (ref 0.7–1.7)
PROEL- ALPHA-1 GLOBULIN: 0.2 G/DL (ref 0–0.4)
PROEL- ALPHA-2 GLOBULIN: 0.8 G/DL (ref 0.4–1)
PROEL- BETA GLOBULIN: 1 G/DL (ref 0.7–1.3)
PROEL- GAMMA GLOBULIN: 0.9 G/DL (ref 0.4–1.8)
PROEL- TOTAL PROTEIN: 6.6 G/DL (ref 6–8.5)
PROT SERPL-MCNC: 6.6 G/DL (ref 6–8.5)

## 2025-01-09 ENCOUNTER — APPOINTMENT (OUTPATIENT)
Dept: RADIOLOGY | Facility: HOSPITAL | Age: 75
End: 2025-01-09
Payer: MEDICARE

## 2025-01-09 ENCOUNTER — HOSPITAL ENCOUNTER (EMERGENCY)
Facility: HOSPITAL | Age: 75
Discharge: HOME | End: 2025-01-09
Payer: MEDICARE

## 2025-01-09 VITALS
HEART RATE: 72 BPM | WEIGHT: 210 LBS | DIASTOLIC BLOOD PRESSURE: 88 MMHG | TEMPERATURE: 97.4 F | RESPIRATION RATE: 16 BRPM | OXYGEN SATURATION: 97 % | SYSTOLIC BLOOD PRESSURE: 154 MMHG | HEIGHT: 71 IN | BODY MASS INDEX: 29.4 KG/M2

## 2025-01-09 DIAGNOSIS — S43.101A ACROMIOCLAVICULAR JOINT SEPARATION, RIGHT, INITIAL ENCOUNTER: Primary | ICD-10-CM

## 2025-01-09 PROCEDURE — 72125 CT NECK SPINE W/O DYE: CPT | Performed by: RADIOLOGY

## 2025-01-09 PROCEDURE — 72125 CT NECK SPINE W/O DYE: CPT

## 2025-01-09 PROCEDURE — 70450 CT HEAD/BRAIN W/O DYE: CPT | Performed by: RADIOLOGY

## 2025-01-09 PROCEDURE — 70450 CT HEAD/BRAIN W/O DYE: CPT

## 2025-01-09 PROCEDURE — 99284 EMERGENCY DEPT VISIT MOD MDM: CPT | Mod: 25

## 2025-01-09 PROCEDURE — 73030 X-RAY EXAM OF SHOULDER: CPT | Mod: RIGHT SIDE | Performed by: RADIOLOGY

## 2025-01-09 PROCEDURE — 73030 X-RAY EXAM OF SHOULDER: CPT | Mod: RT

## 2025-01-09 RX ORDER — HYDROCODONE BITARTRATE AND ACETAMINOPHEN 5; 325 MG/1; MG/1
1 TABLET ORAL EVERY 6 HOURS PRN
Qty: 12 TABLET | Refills: 0 | Status: SHIPPED | OUTPATIENT
Start: 2025-01-09 | End: 2025-01-12

## 2025-01-09 ASSESSMENT — COLUMBIA-SUICIDE SEVERITY RATING SCALE - C-SSRS
2. HAVE YOU ACTUALLY HAD ANY THOUGHTS OF KILLING YOURSELF?: NO
6. HAVE YOU EVER DONE ANYTHING, STARTED TO DO ANYTHING, OR PREPARED TO DO ANYTHING TO END YOUR LIFE?: NO
1. IN THE PAST MONTH, HAVE YOU WISHED YOU WERE DEAD OR WISHED YOU COULD GO TO SLEEP AND NOT WAKE UP?: NO

## 2025-01-09 ASSESSMENT — PAIN SCALES - GENERAL
PAINLEVEL_OUTOF10: 8
PAINLEVEL_OUTOF10: 8

## 2025-01-09 ASSESSMENT — PAIN DESCRIPTION - PAIN TYPE
TYPE: ACUTE PAIN
TYPE: ACUTE PAIN

## 2025-01-09 ASSESSMENT — PAIN DESCRIPTION - FREQUENCY: FREQUENCY: CONSTANT/CONTINUOUS

## 2025-01-09 ASSESSMENT — PAIN DESCRIPTION - ORIENTATION
ORIENTATION: RIGHT
ORIENTATION: RIGHT

## 2025-01-09 ASSESSMENT — PAIN - FUNCTIONAL ASSESSMENT
PAIN_FUNCTIONAL_ASSESSMENT: 0-10
PAIN_FUNCTIONAL_ASSESSMENT: 0-10

## 2025-01-09 ASSESSMENT — PAIN DESCRIPTION - DESCRIPTORS: DESCRIPTORS: SHARP

## 2025-01-09 ASSESSMENT — PAIN DESCRIPTION - LOCATION
LOCATION: SHOULDER
LOCATION: SHOULDER

## 2025-01-09 NOTE — ED PROVIDER NOTES
Chief Complaint   Patient presents with    Shoulder Pain     Pt arrived to ED, c/o fall while unloading his  from truck. Per the pt he fell 4 feet, hit his head, denies LOC, and is presenting with right shoulder pain. Pt on Eliquis, A&O x4.        Patient History    Past Medical History:   Diagnosis Date    Atrial fibrillation (Multi)     Cardiomyopathy     Hypertension       History reviewed. No pertinent surgical history.   No family history on file.   Social History     Social History Narrative    Not on file      Allergies   Allergen Reactions    Grass Pollen Itching    Wool Hives        PMH: Reviewed  PSH: Reviewed  Social History: Reviewed.   Allergies reviewed.     HPI: Huang Quintanilla is a 74 y.o. male who presents to the ED today unaccompanied with complaints of right shoulder pain s/p fall.  States he was unloading a snowblower from the back of his truck with a friend of his.  He fell when the snowblower started to fall as well.  He states that he fell from the tailgate of the truck, onto the ground.  States he did fall onto his right shoulder.  He states that he did hit his head but he is not having any head pain.  The pain is mainly in his right shoulder.  It does radiate somewhat into his right neck.  He does take Eliquis for history of A-fib.    PHYSICAL EXAM:    GENERAL: Vitals noted, no distress. Alert and oriented x 3. Non-toxic.       HEAD: Normocephalic, atraumatic. Pupils equally round and reactive to light. EOMI. TMs clear, no hemotympanum, Villa's sign, or raccoon eyes.    NECK: Supple. No midline or paraspinal tenderness through full range of motion.      CARDIAC: Regular rate, rhythm. No murmurs or rubs.    RESPIRATORY: Lungs clear and equal bilaterally. No respiratory distress.     MUSCULOSKELETAL & SKIN:  Warm, dry, and intact. No rash/lesions. No peripheral edema.  Pain mainly of the right shoulder, distal clavicle, where there is a deformity noted.  Unable to lift the right  arm due to pain.  Distal cap refill in the right arm is less than 2 seconds, radial pulse 2+ and bounding.  Sensation to touch is intact throughout the right arm.  Left arm is nontender and has full range of motion.    NEURO: No focal neurologic deficits, acting appropriately.     Labs Reviewed - No data to display     CT head wo IV contrast   Final Result   Diffuse volume loss and periventricular white matter changes, which   given patient's age likely represent small vessel ischemic disease.        No CT evidence of acute hemorrhage or acute cortical infarct.        No evidence of displaced skull fracture.             MACRO:   None        Signed by: Kate Koroma 1/9/2025 3:10 PM   Dictation workstation:   [id number]      CT cervical spine wo IV contrast   Final Result   No evidence for an acute fracture or subluxation of the cervical   spine.        Reversal normal cervical lordosis and degenerative changes        MACRO:   None        Signed by: Kate Koroma 1/9/2025 3:13 PM   Dictation workstation:   [id number]      XR shoulder right 2+ views   Final Result   Suspect right acromioclavicular joint separation. Further evaluation   with recurrent AC joint films with and without weights may be   obtained.        No gross fracture.        MACRO:   None        Signed by: Kate Koroma 1/9/2025 3:09 PM   Dictation workstation:   [id number]           Medical Decision Making         ED COURSE: This patient was seen and examined by myself independently.  X-ray of the patient's right shoulder shows a suspected right AC joint separation without gross fracture.  Independent review by myself as well as my ED attending do agree.  CT head and neck are also without acute findings, specifically no hemorrhage or skull fracture.  He is driving today, therefore I wrote a prescription for Norco.  He is placed in a sling by nursing staff for treatment of the AC joint separation.  Referred to orthopedics for follow-up care.   Recommended return to ED for any new or worsening symptoms.  Discharged home in stable condition with computer instructions given.      DIAGNOSTIC IMPRESSION: #1 head injury status post fall #2 right AC joint separation status post fall     Martha Bustos, COURT-CNP  01/09/25 [id number]

## 2025-01-21 ENCOUNTER — HOSPITAL ENCOUNTER (OUTPATIENT)
Dept: RADIOLOGY | Facility: EXTERNAL LOCATION | Age: 75
Discharge: HOME | End: 2025-01-21

## 2025-01-21 ENCOUNTER — APPOINTMENT (OUTPATIENT)
Dept: ORTHOPEDIC SURGERY | Facility: CLINIC | Age: 75
End: 2025-01-21
Payer: MEDICARE

## 2025-01-21 VITALS — WEIGHT: 221 LBS | BODY MASS INDEX: 30.82 KG/M2

## 2025-01-21 DIAGNOSIS — S43.101A ACROMIOCLAVICULAR JOINT SEPARATION, RIGHT, INITIAL ENCOUNTER: ICD-10-CM

## 2025-01-21 PROCEDURE — 1036F TOBACCO NON-USER: CPT | Performed by: SPECIALIST

## 2025-01-21 PROCEDURE — 1125F AMNT PAIN NOTED PAIN PRSNT: CPT | Performed by: SPECIALIST

## 2025-01-21 PROCEDURE — 99214 OFFICE O/P EST MOD 30 MIN: CPT | Performed by: SPECIALIST

## 2025-01-21 PROCEDURE — 76882 US LMTD JT/FCL EVL NVASC XTR: CPT | Performed by: SPECIALIST

## 2025-01-21 PROCEDURE — 1159F MED LIST DOCD IN RCRD: CPT | Performed by: SPECIALIST

## 2025-01-21 RX ORDER — DOCUSATE SODIUM 100 MG/1
CAPSULE, LIQUID FILLED ORAL
COMMUNITY

## 2025-01-21 RX ORDER — CARVEDILOL 12.5 MG/1
25 TABLET ORAL 2 TIMES DAILY
COMMUNITY

## 2025-01-21 RX ORDER — AMIODARONE HYDROCHLORIDE 200 MG/1
TABLET ORAL
COMMUNITY
Start: 2024-01-15

## 2025-01-21 RX ORDER — AMLODIPINE BESYLATE 10 MG/1
TABLET ORAL
COMMUNITY
Start: 2024-03-18

## 2025-01-21 RX ORDER — POTASSIUM CHLORIDE 20 MEQ/1
TABLET, EXTENDED RELEASE ORAL
COMMUNITY
Start: 2024-02-15

## 2025-01-21 RX ORDER — LISINOPRIL 20 MG/1
TABLET ORAL
COMMUNITY

## 2025-01-21 RX ORDER — FUROSEMIDE 40 MG/1
TABLET ORAL
COMMUNITY

## 2025-01-21 RX ORDER — IBUPROFEN 600 MG/1
600 TABLET ORAL EVERY 6 HOURS PRN
Qty: 28 TABLET | Refills: 2 | Status: SHIPPED | OUTPATIENT
Start: 2025-01-21

## 2025-01-21 RX ORDER — APIXABAN 5 MG/1
TABLET, FILM COATED ORAL 2 TIMES DAILY
COMMUNITY

## 2025-01-21 RX ORDER — ATORVASTATIN CALCIUM 40 MG/1
TABLET, FILM COATED ORAL
COMMUNITY

## 2025-01-21 ASSESSMENT — PAIN - FUNCTIONAL ASSESSMENT: PAIN_FUNCTIONAL_ASSESSMENT: 0-10

## 2025-01-21 ASSESSMENT — PAIN DESCRIPTION - DESCRIPTORS: DESCRIPTORS: ACHING;DULL;NAGGING

## 2025-01-21 ASSESSMENT — PAIN SCALES - GENERAL: PAINLEVEL_OUTOF10: 6

## 2025-01-21 NOTE — ASSESSMENT & PLAN NOTE
Assessment: Grade 3 AC joint separation.    Plan:  I discussed the risks and benefits of various treatment options with the patient including surgery versus nonoperative care.  He would like to proceed forward with nonoperative care.  Continue to use the sling on a as needed basis.  Motrin 600 p.o. twice daily or 3 times daily for pain use with meals take as directed.  Simple sling wear for comfort.  Icing as needed  Follow-up in 3-4 weeks for a reevaluation.

## 2025-01-21 NOTE — PROGRESS NOTES
Assessment/Plan   Encounter Diagnoses:  Acromioclavicular joint separation, right, initial encounter  Acromioclavicular joint separation, right, initial encounter  Assessment: Grade 3 AC joint separation.    Plan:  I discussed the risks and benefits of various treatment options with the patient including surgery versus nonoperative care.  He would like to proceed forward with nonoperative care.  Continue to use the sling on a as needed basis.  Motrin 600 p.o. twice daily or 3 times daily for pain use with meals take as directed.  Simple sling wear for comfort.  Icing as needed  Follow-up in 3-4 weeks for a reevaluation.       Subjective    Patient ID: Huang Quintanilla is a 74 y.o. male.    Chief Complaint: New Patient Visit and Pain of the Right Shoulder     Last Surgery: No surgery found  Last Surgery Date: No surgery found    HPI  74-year-old male who was trying to get a snowblower off of his vehicle.  His partner let go of the snowblower and he was holding onto it by himself.  Ultimately he fell onto his right shoulder causing the injury.    OBJECTIVE: ORTHO EXAM  Right shoulder exam  Swelling at the AC joint.  Prominence of the distal clavicle.  Tenderness over the CC ligaments.  Ecchymosis distally and in the axilla area.  N/V intact distally.    IMAGE RESULTS:  Point of Care Ultrasound  These images are not reportable by radiology and will not be interpreted   by  Radiologists.  X-rays show a grade 3 AC separation.  No other fracture or dislocation is seen.    ULTRASOUND  DIAGNOSTIC ULTRASOUND FINAL REPORT: [LEFT] SHOULDER  Provider: Huang Durham MD  Date of Exam: Today  Procedure: Ultrasound, extremity, nonvascular, real-time, COMPLETE, anatomic specific.  Site:   SHOULDER  Indication:  SHOULDER PAIN  Technique: B-Mode Ultrasound Examination performed using 8-13 MHz linear transducer with [employer]  STUDY TYPE:   1. ULTRASOUND EXTREMITY INCLUDING BUT NOT LIMITED TO SHOULDER MUSCLE, TENDONS, LIGAMENTS,  FATTY TISSUES, SUBCUTANEOUS TISSUES AND OTHER SOFT TISSUE STRUCTURES SUCH AS ABSCESSES OR FREE FLUID ACCUMULATION WITHIN THE PRIMARY JOINT AS WELL AS ADJACENT JOINTS.  2. REAL TIME WITH IMAGE DOCUMENTATION  3. NON-VASCULAR  4. COMPLETE STUDY WHICH INCLUDES A THOROUGH EVALUATION OF THE SHOULDER MUSCLE, TENDONS, LIGAMENTS, FATTY TISSUES, SUBCUTANEOUS TISSUES AND OTHER SOFT TISSUE STRUCTURES SUCH AS ABSCESSES OR FREE FLUID ACCUMULATION WITHIN THE PRIMARY JOINT AS WELL AS ADJACENT JOINTS.  Live ultrasound was performed of the patient´s  SHOULDER and PERMANENTLY documented.  This is a thorough and complete evaluation of a specific anatomic region specifically the  SHOULDER.  PERMANENT Image documentation was performed.  This is the complete and final ultrasound report of the patient's  SHOULDER.  The patient was positioned in order to optimize the ultrasound evaluation of the  SHOULDER.  Ultrasound gel was used as a conductive medium in order to both transmit and receive ultrasonic signals that characterize the soft tissues. . I personally performed the ultrasound and reviewed the findings. These show:  Findings:  SHOULDER  Gerda-articular evaluation: Live ultrasound was performed of the patient's  SHOULDER that shows separation of the acromion and distal clavicle consistent with a grade 3 AC separation.  Soft tissue swelling in the area of the CC ligaments.  What could be discerned of the rotator cuff appears intact but positioning was difficult compromising the exam.      Procedures     Orders Placed This Encounter    Point of Care Ultrasound

## 2025-02-18 ENCOUNTER — APPOINTMENT (OUTPATIENT)
Dept: ORTHOPEDIC SURGERY | Facility: CLINIC | Age: 75
End: 2025-02-18
Payer: MEDICARE

## 2025-03-05 ENCOUNTER — HOSPITAL ENCOUNTER (OUTPATIENT)
Dept: RADIOLOGY | Facility: EXTERNAL LOCATION | Age: 75
Discharge: HOME | End: 2025-03-05

## 2025-03-05 ENCOUNTER — APPOINTMENT (OUTPATIENT)
Dept: ORTHOPEDIC SURGERY | Facility: CLINIC | Age: 75
End: 2025-03-05
Payer: MEDICARE

## 2025-03-05 DIAGNOSIS — S43.101A ACROMIOCLAVICULAR JOINT SEPARATION, RIGHT, INITIAL ENCOUNTER: ICD-10-CM

## 2025-03-05 PROCEDURE — 1125F AMNT PAIN NOTED PAIN PRSNT: CPT | Performed by: SPECIALIST

## 2025-03-05 PROCEDURE — 1160F RVW MEDS BY RX/DR IN RCRD: CPT | Performed by: SPECIALIST

## 2025-03-05 PROCEDURE — 1159F MED LIST DOCD IN RCRD: CPT | Performed by: SPECIALIST

## 2025-03-05 PROCEDURE — 1036F TOBACCO NON-USER: CPT | Performed by: SPECIALIST

## 2025-03-05 PROCEDURE — 99214 OFFICE O/P EST MOD 30 MIN: CPT | Performed by: SPECIALIST

## 2025-03-05 ASSESSMENT — PAIN DESCRIPTION - DESCRIPTORS: DESCRIPTORS: ACHING

## 2025-03-05 ASSESSMENT — PAIN SCALES - GENERAL: PAINLEVEL_OUTOF10: 2

## 2025-03-05 ASSESSMENT — PAIN - FUNCTIONAL ASSESSMENT: PAIN_FUNCTIONAL_ASSESSMENT: 0-10

## 2025-03-05 NOTE — ASSESSMENT & PLAN NOTE
Assessment: Grade 3 AC joint separation.    Plan:  He states that he is improving but he still has some pain in the shoulder and feels as if the shoulder is tight.  Physical therapy for range of motion and strengthening and treatment for the grade 3 AC separation.  Follow-up in 8 weeks for reevaluation.  He is already taking ibuprofen for his back and this does help with the shoulder.

## 2025-03-05 NOTE — PROGRESS NOTES
Assessment/Plan   Encounter Diagnoses:  Acromioclavicular joint separation, right, initial encounter  Acromioclavicular joint separation, right, initial encounter  Assessment: Grade 3 AC joint separation.    Plan:  He states that he is improving but he still has some pain in the shoulder and feels as if the shoulder is tight.  Physical therapy for range of motion and strengthening and treatment for the grade 3 AC separation.  Follow-up in 8 weeks for reevaluation.  He is already taking ibuprofen for his back and this does help with the shoulder.         Subjective    Patient ID: Huang Quintanilla is a 74 y.o. male.    Chief Complaint: Follow-up of the Right Shoulder     Last Surgery: No surgery found  Last Surgery Date: No surgery found    HPI  74-year-old male who was trying to get a snowblower off of his vehicle.  His partner let go of the snowblower and he was holding onto it by himself.  Ultimately he fell onto his right shoulder causing the injury.      3/5/2025-he states that he is definitely improving however he still has some soreness in the shoulder and a sense of stiffness in the shoulder.  The distal clavicle is nontender but the posterior aspect of the acromion is  to palpation.    OBJECTIVE: ORTHO EXAM  Right shoulder exam  Swelling at the AC joint.  Prominence of the distal clavicle.  Tenderness over the CC ligaments.  He is also tender to direct palpation over the posterior acromion which may represent resolution of a bone bruise in this area.  Ecchymosis distally and in the axilla area has now completely resolved.  N/V intact distally.    IMAGE RESULTS:  Point of Care Ultrasound  These images are not reportable by radiology and will not be interpreted   by  Radiologists.  Previous x-rays show a grade 3 AC separation.  No other fracture or dislocation is seen.        Procedures     Orders Placed This Encounter    Point of Care Ultrasound

## 2025-03-06 ENCOUNTER — HOSPITAL ENCOUNTER (OUTPATIENT)
Dept: HOSPITAL 100 - CT | Age: 75
Discharge: HOME | End: 2025-03-06
Payer: MEDICARE

## 2025-03-06 DIAGNOSIS — F17.210: ICD-10-CM

## 2025-03-06 DIAGNOSIS — Z12.2: Primary | ICD-10-CM

## 2025-03-06 PROCEDURE — 71271 CT THORAX LUNG CANCER SCR C-: CPT

## 2025-03-12 DIAGNOSIS — S43.101A ACROMIOCLAVICULAR JOINT SEPARATION, RIGHT, INITIAL ENCOUNTER: ICD-10-CM

## 2025-03-21 ENCOUNTER — EVALUATION (OUTPATIENT)
Dept: PHYSICAL THERAPY | Facility: CLINIC | Age: 75
End: 2025-03-21
Payer: MEDICARE

## 2025-03-21 DIAGNOSIS — S43.101A ACROMIOCLAVICULAR JOINT SEPARATION, RIGHT, INITIAL ENCOUNTER: ICD-10-CM

## 2025-03-21 PROCEDURE — 97110 THERAPEUTIC EXERCISES: CPT | Mod: GP

## 2025-03-21 PROCEDURE — 97161 PT EVAL LOW COMPLEX 20 MIN: CPT | Mod: GP

## 2025-03-21 ASSESSMENT — PAIN - FUNCTIONAL ASSESSMENT: PAIN_FUNCTIONAL_ASSESSMENT: 0-10

## 2025-03-21 ASSESSMENT — PATIENT HEALTH QUESTIONNAIRE - PHQ9
SUM OF ALL RESPONSES TO PHQ9 QUESTIONS 1 AND 2: 0
2. FEELING DOWN, DEPRESSED OR HOPELESS: NOT AT ALL
1. LITTLE INTEREST OR PLEASURE IN DOING THINGS: NOT AT ALL

## 2025-03-21 ASSESSMENT — ENCOUNTER SYMPTOMS
OCCASIONAL FEELINGS OF UNSTEADINESS: 0
DEPRESSION: 0
LOSS OF SENSATION IN FEET: 0

## 2025-03-21 ASSESSMENT — ACTIVITIES OF DAILY LIVING (ADL): EFFECT OF PAIN ON DAILY ACTIVITIES: SEE GOALS

## 2025-03-21 ASSESSMENT — PAIN SCALES - GENERAL: PAINLEVEL_OUTOF10: 8

## 2025-03-21 NOTE — PROGRESS NOTES
Physical Therapy Evaluation and Treatment      Patient Name: Huang Quintanilla  MRN: [id number]  Today's Date: 3/21/2025  : 1950  Huang Durham  Time Calculation  Start Time: 1605  Stop Time: 1655  Time Calculation (min): 50 min    PT Evaluation Time Entry  PT Evaluation (Low) Time Entry: 35   PT Therapeutic Procedures Time Entry  Therapeutic Exercise Time Entry: 12                         Assessment:  Rehab Prognosis: Good  Barriers to Participation:  (none)    Plan:  Treatment/Interventions: Aquatic therapy, Cryotherapy, Education/ Instruction, Hot pack, Manual therapy, Self care/ home management, Therapeutic exercises, Other (comment) (IASTM/cupping)  PT Plan: Skilled PT  PT Frequency: 2 times per week  Duration: 4wks  Onset Date: 24  Certification Period Start Date: 25  Certification Period End Date: 25  Rehab Potential: Good  Plan of Care Agreement: Patient    Current Problem:   1. Acromioclavicular joint separation, right, initial encounter  Referral to Physical Therapy    Follow Up In Physical Therapy          Subjective    General:  General  Reason for Referral: AC separation (gr 3)  Referred By: Marva  C/C sx*/Max sx level*:8/10 at the  [facility] with use  HARSH/DOI/Work*?:  fall onto [facility] 25  ADL makes worse*?:movement  ADL makes better?: rest/support  PLOF:Unlimited job/home tasks performed-NO: housework with RUE:   (see goals)  Testing*: films show-Suspect right acromioclavicular joint separation. Further evaluation  with recurrent AC joint films with and without weights may be  obtained.      No gross fracture.    Physical Findings*: Limited: AROM ( R shldr )                                                Strength ( R shldr )                                                 POC:  Ongoing clinic PT to include:continue with R Gh and scapulothoracic joint PRE as na;  also include biceps PRE as na    Other: (copay*?- no ) (fall risk*?- no  ) (ins visit limit*?- no  )  "    Precautions:  Precautions  STEADI Fall Risk Score (The score of 4 or more indicates an increased risk of falling): 3  Precautions Comment: A-fib; cardiomyopathy    Pain:  Pain Assessment  Pain Assessment: 0-10  0-10 (Numeric) Pain Score: 8 (max sx)  Pain Location: Shoulder  Pain Orientation: Right  Pain Onset:  (1/31/25 fall)  Effect of Pain on Daily Activities: see goals    Prior Level of Function:  Prior Function Per Pt/Caregiver Report  Vocational: Retired    Objective     Outcome Measures:  Other Measures  Disability of Arm Shoulder Hand (DASH): 27     Treatments:  Shrugs x10  B ER x10  Chest press x10  POC:  Ongoing clinic PT to include:continue with R Gh and scapulothoracic joint PRE as na (emphasize rot cuff and scap upward rotators);  also include biceps PRE as na  OP EDUCATION:  Access Code: [email or web address]  URL: [email or web address]  Date: 03/21/2025  Prepared by: Wesley Schreiber    Exercises  - Standing Shoulder Shrugs   - Standing Shoulder External Rotation with Resistance   - Chest Press with Resistance     Goals:  Active       PT Problem       PT Goal 1       Start:  03/21/25    Expected End:  06/19/25       1. Independent HEP to allow for 50% reduction in max ADL C/C sx (8/10) 2-3wks  2. Survey score improvement from 36% to 25% (QDI) 3-4wks  3. ROM increase to allow for improved ADL reaching (from 80 to 120 active shldr elevation) 3-4wks  4. Strength increase to allow for improved ADL object handling (from 4-/5 to 4+/5 R shldr) 3-4wks             PT Goal 2       Start:  03/21/25    Expected End:  06/19/25       1. \"I want my  shldr  to feel better\".               Shoulder        Shoulder Palpation/Joint Mobility Assessment  Shoulder Palpation/Joint Mobility Comment: marked tenting of the distal R clavicle    Shoulder AROM WFL unless documented below  R Shoulder flexion: (180°): 80  L Shoulder flexion: (180°): 120  Shoulder PROM WFL unless documented below  R shoulder flexion: " (180°): 140  L shoulder flexion: (180°): 140  R shoulder ER: (90°): 80  L shoulder ER: (90°): 80  R shoulder IR: (70°): 80  L shoulder IR: (70°): 90  Upper Extremity Strength:  MMT 5/5 max  RIGHT LEFT   Shldr Abduction   4- /5   5 /5   Shldr ER   4- /5    5/5   Shldr IR    4/5    5/5   Shldr Flexion   4- /5    5/5

## 2025-03-31 ENCOUNTER — TREATMENT (OUTPATIENT)
Dept: PHYSICAL THERAPY | Facility: CLINIC | Age: 75
End: 2025-03-31
Payer: MEDICARE

## 2025-03-31 DIAGNOSIS — S43.101A ACROMIOCLAVICULAR JOINT SEPARATION, RIGHT, INITIAL ENCOUNTER: ICD-10-CM

## 2025-03-31 PROCEDURE — 97110 THERAPEUTIC EXERCISES: CPT | Mod: GP,CQ

## 2025-03-31 ASSESSMENT — PAIN - FUNCTIONAL ASSESSMENT: PAIN_FUNCTIONAL_ASSESSMENT: 0-10

## 2025-03-31 ASSESSMENT — PAIN SCALES - GENERAL: PAINLEVEL_OUTOF10: 3

## 2025-03-31 NOTE — PROGRESS NOTES
"Physical Therapy Treatment    Patient Name: Huang Quintanilla  MRN: [id number]  : 1950  Marva,Huang MICHAUD  Today's Date: 3/31/2025  Time Calculation  Start Time: 1315  Stop Time: 1359  Time Calculation (min): 44 min  PT Therapeutic Procedures Time Entry  Therapeutic Exercise Time Entry: 42         Assessment:  Patient identified by name & .  Treatment consisted of ex's for right arm/shoulder strengthening. Patient appropriately challenged with ex's reporting that his bones \"rubbed\" with some of the ex's. Progressed HEP and handouts given. Patient reporting decreased pain of 2/10 at end of session.   Plan:  Progress scapular strengthening per patient's tolerance for improved ability to hang clothes in closet. -MA   OP PT Plan  Treatment/Interventions: Aquatic therapy, Cryotherapy, Education/ Instruction, Hot pack, Manual therapy, Self care/ home management, Therapeutic exercises, Other (comment) (IASTM/cupping)  PT Plan: Skilled PT  PT Frequency: 2 times per week  Duration: 4wks  Onset Date: 24  Certification Period Start Date: 25  Certification Period End Date: 25  Rehab Potential: Good  Plan of Care Agreement: Patient    Current Problem  Problem List Items Addressed This Visit             ICD-10-CM    Acromioclavicular joint separation, right, initial encounter S43.101A       Subjective States that he is having more pain on the top of his R shoulder. States that his shoulder feels stiff. Is taking Ibuprofen and he took one today. Pain is 3/10 at rest and goes up to 8/10 with activity.   General  Reason for Referral: AC separation (gr 3)  Referred By: Marva  Visit #2  Precautions  Precautions  STEADI Fall Risk Score (The score of 4 or more indicates an increased risk of falling): 3  Precautions Comment: A-fib; cardiomyopathy    Pain  Pain Assessment: 0-10  0-10 (Numeric) Pain Score: 3  Pain Location: Shoulder  Pain Orientation: Right    Objective:     Treatments:  Therapeutic Exercise: x42' " "  Seated Shrugs x10  Seated BW shoulder rolls, small motion x10 (N)  Seated scap squeezes x10 (N)  Seated bicep curls, 2# 2x10 (N)  Seated hammer curls, 2# 2x10 (N)  Seated cane scaption 5\" x10 (N)  Supine cane flexion 5\" x10 (N)  B ER x10, mint green   Chest press, purple tube 2x10  B shoulder extension, purple tube 2x10 (N)  POC:  Ongoing clinic PT to include:continue with R Gh and scapulothoracic joint PRE as na (emphasize rot cuff and scap upward rotators);  also include biceps PRE as na  OP EDUCATION:  Access Code: [email or web address]  URL: [email or web address]  Date: 03/21/2025  Prepared by: Wesley Schreiber  Exercises  - Standing Shoulder Shrugs   - Standing Shoulder External Rotation with Resistance   - Chest Press with Resistance     Access Code: [email or web address]  URL: [email or web address]  Date: 03/31/2025  Prepared by: Merary Billy  Exercises  - Standing Backward Shoulder Rolls  - 1 x daily - 7 x weekly - 1-2 sets - 10 reps  - Standing Scapular Retraction  - 1 x daily - 7 x weekly - 1-2 sets - 10 reps  - Standing Single Arm Bicep Curls Supinated with Dumbbell  - 1 x daily - 7 x weekly - 2 sets - 10 reps  - Standing Single Arm Bicep Curls Neutral with Dumbbell  - 1 x daily - 7 x weekly - 2 sets - 10 reps  - Seated Shoulder Abduction AAROM with Dowel  - 1 x daily - 7 x weekly - 1 sets - 10 reps - 5 second hold  - Supine Shoulder Flexion AAROM with Dowel  - 1 x daily - 7 x weekly - 1 sets - 10 reps - 5 second hold      Goals:  Active       PT Problem       PT Goal 1       Start:  03/21/25    Expected End:  06/19/25       1. Independent HEP to allow for 50% reduction in max ADL C/C sx (8/10) 2-3wks  2. Survey score improvement from 36% to 25% (QDI) 3-4wks  3. ROM increase to allow for improved ADL reaching (from 80 to 120 active shldr elevation) 3-4wks  4. Strength increase to allow for improved ADL object handling (from 4-/5 to 4+/5 R shldr) 3-4wks             PT Goal 2       " "Start:  03/21/25    Expected End:  06/19/25       1. \"I want my  shldr  to feel better\".              "

## 2025-04-02 ENCOUNTER — TREATMENT (OUTPATIENT)
Dept: PHYSICAL THERAPY | Facility: CLINIC | Age: 75
End: 2025-04-02
Payer: MEDICARE

## 2025-04-02 DIAGNOSIS — S43.101A ACROMIOCLAVICULAR JOINT SEPARATION, RIGHT, INITIAL ENCOUNTER: ICD-10-CM

## 2025-04-02 PROCEDURE — 97110 THERAPEUTIC EXERCISES: CPT | Mod: GP

## 2025-04-02 ASSESSMENT — PAIN SCALES - GENERAL: PAINLEVEL_OUTOF10: 0 - NO PAIN

## 2025-04-02 ASSESSMENT — PAIN - FUNCTIONAL ASSESSMENT: PAIN_FUNCTIONAL_ASSESSMENT: 0-10

## 2025-04-02 NOTE — PROGRESS NOTES
Physical Therapy Treatment    Patient Name: Huang Quintanilla  MRN: [id number]  : 1950  Today's Date: 2025  MarvaHuang  Time Calculation  Start Time: 1316  Stop Time: 1353  Time Calculation (min): 37 min      PT Therapeutic Procedures Time Entry  Therapeutic Exercise Time Entry: 35                         General:  General  Reason for Referral: AC separation (gr 3)  Referred By: Leb  Visit #3    Current Problem  Problem List Items Addressed This Visit             ICD-10-CM    Acromioclavicular joint separation, right, initial encounter S43.101A         Assessment:Patient identity confirmed today with name/. ;  ( 0 ) falls since last clinic visit; modified/added to clinic/HEP today;       Plan:  Treatment/Interventions: Aquatic therapy, Cryotherapy, Education/ Instruction, Hot pack, Manual therapy, Self care/ home management, Therapeutic exercises, Other (comment) (IASTM/cupping)  PT Plan: Skilled PT  PT Frequency: 2 times per week  Duration: 4wks  Onset Date: 24  Certification Period Start Date: 25  Certification Period End Date: 25  Rehab Potential: Good  Plan of Care Agreement: Patient  Continue with clinic rehab treatments toward functional goals ( reaching);  continue with precaution of excess shldr pain    Subjective: I have  0 /10 pain right now.   I hurt a lot after the last PT visit.  I am not able to perform all the exercises at once.      Precautions  Precautions  STEADI Fall Risk Score (The score of 4 or more indicates an increased risk of falling): 3  Precautions Comment: A-fib; cardiomyopathy      Pain  Pain Assessment: 0-10  0-10 (Numeric) Pain Score: 0 - No pain  Pain Location: Shoulder  Pain Orientation: Right    Objective    Manual:__0___min    There ex:__35___min  UBE x2' fwd Lv1 N  Seated Shrugs 2x10 P  Seated BW shoulder rolls, small motion x15 P  Seated bicep curls, (mild shldr flexion and 1/4 body turn also) 3# 2x10 P  Tric extensions mild shldr flexion and 1/4  "body turn) 2x10 purple N  Stdg IR purple 2x10 N  B ER 2x10 purple P   Chest press, purple tube 2x10  Pulley scaption 2' N    Shldr taps-A  Supine ceiling punch-A    B shoulder extension, purple tube 2x10 X  Seated hammer curls, 2# 2x10 X  Seated cane scaption 5\" x10 X  Supine cane flexion 5\" x10 X  Seated scap squeezes x10 X  POC:  Ongoing clinic PT to include:continue with R Gh and scapulothoracic joint PRE as na (emphasize rot cuff and scap upward rotators);  also include biceps PRE as na  OP EDUCATION:  Access Code: [email or web address]  URL: [email or web address]  Date: 03/21/2025  Prepared by: Wesley Schreiber  Exercises  - Standing Shoulder Shrugs   - Standing Shoulder External Rotation with Resistance   - Chest Press with Resistance      Access Code: [email or web address]  URL: [email or web address]  Date: 03/31/2025  Prepared by: Merary Billy  Exercises  - Standing Backward Shoulder Rolls  - 1 x daily - 7 x weekly - 1-2 sets - 10 reps  - Standing Scapular Retraction  - 1 x daily - 7 x weekly - 1-2 sets - 10 reps  - Standing Single Arm Bicep Curls Supinated with Dumbbell  - 1 x daily - 7 x weekly - 2 sets - 10 reps  - Standing Single Arm Bicep Curls Neutral with Dumbbell  - 1 x daily - 7 x weekly - 2 sets - 10 reps  - Seated Shoulder Abduction AAROM with Dowel  - 1 x daily - 7 x weekly - 1 sets - 10 reps - 5 second hold  - Supine Shoulder Flexion AAROM with Dowel  - 1 x daily - 7 x weekly - 1 sets - 10 reps - 5 second hold          OP EDUCATION:  Access Code: [email or web address]  URL: [email or web address]  Date: 04/02/2025  Prepared by: Wesley Schreiber    Exercises  - Standing Shoulder Shrugs   - Standing Backward Shoulder Rolls   - Seated Single Arm Bicep Curls with Rotation and Dumbbell (Mirrored)   - Elbow Extension Self Anchored with Resistance (Mirrored)   - Standing Shoulder Internal Rotation with Anchored Resistance (Mirrored)   - Standing Shoulder External Rotation " "with Resistance   - Chest Press with Resistance   - Seated Shoulder Scaption AAROM with Pulley at Side     Goals:  Active       PT Problem       PT Goal 1       Start:  03/21/25    Expected End:  06/19/25       1. Independent HEP to allow for 50% reduction in max ADL C/C sx (8/10) 2-3wks  2. Survey score improvement from 36% to 25% (QDI) 3-4wks  3. ROM increase to allow for improved ADL reaching (from 80 to 120 active shldr elevation) 3-4wks  4. Strength increase to allow for improved ADL object handling (from 4-/5 to 4+/5 R shldr) 3-4wks             PT Goal 2       Start:  03/21/25    Expected End:  06/19/25       1. \"I want my  shldr  to feel better\".              "

## 2025-04-07 ENCOUNTER — APPOINTMENT (OUTPATIENT)
Dept: PHYSICAL THERAPY | Facility: CLINIC | Age: 75
End: 2025-04-07
Payer: MEDICARE

## 2025-04-09 ENCOUNTER — APPOINTMENT (OUTPATIENT)
Dept: PHYSICAL THERAPY | Facility: CLINIC | Age: 75
End: 2025-04-09
Payer: MEDICARE

## 2025-04-09 ENCOUNTER — DOCUMENTATION (OUTPATIENT)
Dept: PHYSICAL THERAPY | Facility: CLINIC | Age: 75
End: 2025-04-09
Payer: MEDICARE

## 2025-04-09 NOTE — PROGRESS NOTES
Physical Therapy                 Therapy Communication Note    Patient Name: Huang Quintanilla  MRN: [id number]  Department:   Room: Room/bed info not found  Today's Date: 4/9/2025     Discipline: Physical Therapy    Missed Time: Cancel    Comment: Patient cancelled all remaining visits via My Chart. No reason given.

## 2025-04-14 ENCOUNTER — APPOINTMENT (OUTPATIENT)
Dept: PHYSICAL THERAPY | Facility: CLINIC | Age: 75
End: 2025-04-14
Payer: MEDICARE

## 2025-04-16 ENCOUNTER — APPOINTMENT (OUTPATIENT)
Dept: PHYSICAL THERAPY | Facility: CLINIC | Age: 75
End: 2025-04-16
Payer: MEDICARE

## 2025-04-22 ENCOUNTER — APPOINTMENT (OUTPATIENT)
Dept: PHYSICAL THERAPY | Facility: CLINIC | Age: 75
End: 2025-04-22
Payer: MEDICARE

## 2025-04-24 ENCOUNTER — APPOINTMENT (OUTPATIENT)
Dept: PHYSICAL THERAPY | Facility: CLINIC | Age: 75
End: 2025-04-24
Payer: MEDICARE

## 2025-04-30 ENCOUNTER — APPOINTMENT (OUTPATIENT)
Dept: ORTHOPEDIC SURGERY | Facility: CLINIC | Age: 75
End: 2025-04-30
Payer: MEDICARE

## 2025-05-05 ENCOUNTER — DOCUMENTATION (OUTPATIENT)
Dept: PHYSICAL THERAPY | Facility: CLINIC | Age: 75
End: 2025-05-05
Payer: MEDICARE

## 2025-05-05 NOTE — PROGRESS NOTES
Physical Therapy    Discharge Summary    Name: Huang Quintanilla  MRN: [id number]  : 1950  Date: 25    Discharge Summary: PT         Has been seen in clinical physical therapy beginning on 3/21 for 3  visit (s); there has been no further visits attended. DC from PT